# Patient Record
Sex: FEMALE | Race: WHITE | NOT HISPANIC OR LATINO | Employment: OTHER | URBAN - METROPOLITAN AREA
[De-identification: names, ages, dates, MRNs, and addresses within clinical notes are randomized per-mention and may not be internally consistent; named-entity substitution may affect disease eponyms.]

---

## 2017-05-18 ENCOUNTER — ALLSCRIPTS OFFICE VISIT (OUTPATIENT)
Dept: OTHER | Facility: OTHER | Age: 67
End: 2017-05-18

## 2017-07-20 ENCOUNTER — GENERIC CONVERSION - ENCOUNTER (OUTPATIENT)
Dept: OTHER | Facility: OTHER | Age: 67
End: 2017-07-20

## 2017-08-11 ENCOUNTER — GENERIC CONVERSION - ENCOUNTER (OUTPATIENT)
Dept: OTHER | Facility: OTHER | Age: 67
End: 2017-08-11

## 2017-11-01 DIAGNOSIS — E78.5 HYPERLIPIDEMIA: ICD-10-CM

## 2017-11-01 DIAGNOSIS — R53.83 OTHER FATIGUE: ICD-10-CM

## 2017-11-01 DIAGNOSIS — I10 ESSENTIAL (PRIMARY) HYPERTENSION: ICD-10-CM

## 2017-12-20 ENCOUNTER — APPOINTMENT (OUTPATIENT)
Dept: LAB | Age: 67
End: 2017-12-20
Payer: MEDICARE

## 2017-12-20 ENCOUNTER — TRANSCRIBE ORDERS (OUTPATIENT)
Dept: LAB | Age: 67
End: 2017-12-20

## 2017-12-20 DIAGNOSIS — R53.83 OTHER FATIGUE: ICD-10-CM

## 2017-12-20 DIAGNOSIS — I10 ESSENTIAL (PRIMARY) HYPERTENSION: ICD-10-CM

## 2017-12-20 DIAGNOSIS — E78.5 HYPERLIPIDEMIA: ICD-10-CM

## 2017-12-20 LAB
ALBUMIN SERPL BCP-MCNC: 3.7 G/DL (ref 3.5–5)
ALP SERPL-CCNC: 88 U/L (ref 46–116)
ALT SERPL W P-5'-P-CCNC: 23 U/L (ref 12–78)
ANION GAP SERPL CALCULATED.3IONS-SCNC: 6 MMOL/L (ref 4–13)
AST SERPL W P-5'-P-CCNC: 16 U/L (ref 5–45)
BASOPHILS # BLD AUTO: 0.05 THOUSANDS/ΜL (ref 0–0.1)
BASOPHILS NFR BLD AUTO: 1 % (ref 0–1)
BILIRUB SERPL-MCNC: 0.52 MG/DL (ref 0.2–1)
BUN SERPL-MCNC: 18 MG/DL (ref 5–25)
CALCIUM SERPL-MCNC: 9 MG/DL (ref 8.3–10.1)
CHLORIDE SERPL-SCNC: 106 MMOL/L (ref 100–108)
CHOLEST SERPL-MCNC: 201 MG/DL (ref 50–200)
CO2 SERPL-SCNC: 29 MMOL/L (ref 21–32)
CREAT SERPL-MCNC: 0.66 MG/DL (ref 0.6–1.3)
EOSINOPHIL # BLD AUTO: 0.07 THOUSAND/ΜL (ref 0–0.61)
EOSINOPHIL NFR BLD AUTO: 1 % (ref 0–6)
ERYTHROCYTE [DISTWIDTH] IN BLOOD BY AUTOMATED COUNT: 12.4 % (ref 11.6–15.1)
GFR SERPL CREATININE-BSD FRML MDRD: 91 ML/MIN/1.73SQ M
GLUCOSE P FAST SERPL-MCNC: 93 MG/DL (ref 65–99)
HCT VFR BLD AUTO: 44.7 % (ref 34.8–46.1)
HDLC SERPL-MCNC: 58 MG/DL (ref 40–60)
HGB BLD-MCNC: 14.5 G/DL (ref 11.5–15.4)
LDLC SERPL CALC-MCNC: 118 MG/DL (ref 0–100)
LYMPHOCYTES # BLD AUTO: 2.29 THOUSANDS/ΜL (ref 0.6–4.47)
LYMPHOCYTES NFR BLD AUTO: 34 % (ref 14–44)
MCH RBC QN AUTO: 30.8 PG (ref 26.8–34.3)
MCHC RBC AUTO-ENTMCNC: 32.4 G/DL (ref 31.4–37.4)
MCV RBC AUTO: 95 FL (ref 82–98)
MONOCYTES # BLD AUTO: 0.66 THOUSAND/ΜL (ref 0.17–1.22)
MONOCYTES NFR BLD AUTO: 10 % (ref 4–12)
NEUTROPHILS # BLD AUTO: 3.7 THOUSANDS/ΜL (ref 1.85–7.62)
NEUTS SEG NFR BLD AUTO: 54 % (ref 43–75)
NRBC BLD AUTO-RTO: 0 /100 WBCS
PLATELET # BLD AUTO: 302 THOUSANDS/UL (ref 149–390)
PMV BLD AUTO: 11.6 FL (ref 8.9–12.7)
POTASSIUM SERPL-SCNC: 4.2 MMOL/L (ref 3.5–5.3)
PROT SERPL-MCNC: 7.7 G/DL (ref 6.4–8.2)
RBC # BLD AUTO: 4.71 MILLION/UL (ref 3.81–5.12)
SODIUM SERPL-SCNC: 141 MMOL/L (ref 136–145)
TRIGL SERPL-MCNC: 124 MG/DL
TSH SERPL DL<=0.05 MIU/L-ACNC: 1.81 UIU/ML (ref 0.36–3.74)
WBC # BLD AUTO: 6.78 THOUSAND/UL (ref 4.31–10.16)

## 2017-12-20 PROCEDURE — 84443 ASSAY THYROID STIM HORMONE: CPT

## 2017-12-20 PROCEDURE — 80061 LIPID PANEL: CPT

## 2017-12-20 PROCEDURE — 36415 COLL VENOUS BLD VENIPUNCTURE: CPT

## 2017-12-20 PROCEDURE — 80053 COMPREHEN METABOLIC PANEL: CPT

## 2017-12-20 PROCEDURE — 85025 COMPLETE CBC W/AUTO DIFF WBC: CPT

## 2017-12-27 DIAGNOSIS — R92.8 OTHER ABNORMAL AND INCONCLUSIVE FINDINGS ON DIAGNOSTIC IMAGING OF BREAST: ICD-10-CM

## 2017-12-27 DIAGNOSIS — Z12.31 ENCOUNTER FOR SCREENING MAMMOGRAM FOR MALIGNANT NEOPLASM OF BREAST: ICD-10-CM

## 2017-12-28 ENCOUNTER — ALLSCRIPTS OFFICE VISIT (OUTPATIENT)
Dept: OTHER | Facility: OTHER | Age: 67
End: 2017-12-28

## 2018-01-12 ENCOUNTER — HOSPITAL ENCOUNTER (OUTPATIENT)
Dept: RADIOLOGY | Age: 68
Discharge: HOME | End: 2018-01-12
Payer: MEDICARE

## 2018-01-12 DIAGNOSIS — Z12.31 ENCOUNTER FOR SCREENING MAMMOGRAM FOR MALIGNANT NEOPLASM OF BREAST: ICD-10-CM

## 2018-01-12 PROCEDURE — 77067 SCR MAMMO BI INCL CAD: CPT

## 2018-01-13 VITALS
TEMPERATURE: 97.7 F | RESPIRATION RATE: 16 BRPM | SYSTOLIC BLOOD PRESSURE: 162 MMHG | DIASTOLIC BLOOD PRESSURE: 80 MMHG | HEIGHT: 65 IN | WEIGHT: 184.25 LBS | HEART RATE: 68 BPM | BODY MASS INDEX: 30.7 KG/M2

## 2018-01-22 VITALS
TEMPERATURE: 96.6 F | DIASTOLIC BLOOD PRESSURE: 80 MMHG | RESPIRATION RATE: 16 BRPM | HEART RATE: 72 BPM | WEIGHT: 180.13 LBS | HEIGHT: 65 IN | SYSTOLIC BLOOD PRESSURE: 136 MMHG | BODY MASS INDEX: 30.01 KG/M2

## 2018-01-23 NOTE — PROCEDURES
Chief Complaint  decreased hearing      Current Meds   1  Alendronate Sodium 35 MG Oral Tablet; TAKE 1 TABLET WEEKLY ON AN EMPTY   STOMACH 30-60 MINUTES BEFORE BREAKFAST WITH 8-12 OUNCES OF WATER  DO   NOT LIE DOWN AFTER TAKING PILL; Therapy: 45SAH8793 to (Last Rx:17Nov2016)  Requested for: 14UGL1045 Ordered   2  EQL Vitamin D3 1000 UNIT TABS Recorded   3  MetroNIDAZOLE 0 75 % External Cream; APPLY AND GENTLY MASSAGE INTO   AFFECTED AREA(S) TWICE DAILY; Therapy: 47EDM8521 to (Last Rx:17Nov2016)  Requested for: 43RUD9287 Ordered   4  Valsartan 160 MG Oral Tablet; take 1 tablet by mouth every day; Therapy: 48MUY5297 to (Dairl Height)  Requested for: 66HZI3838; Last   Rx:[id number] Ordered    Allergies    1  PriLOSEC OTC TBEC    Vitals  Signs    Temperature: 96 6 F  Heart Rate: 72  Respiration: 16  Systolic: 317  Diastolic: 80  Height: 5 ft 5 in  Weight: 180 lb 2 oz  BMI Calculated: 29 97  BSA Calculated: 1 89  Pain Scale: 0    Procedure    Procedure: cerumen removal    Indication: tympanic membrane(s) could not be visualized and cerumen impaction in both ears  Procedure Note: The procedure was performed by the Provider and lasted 5 minute(s)  A otoscope and speculum was placed in the ear canal(s) to visualize the ear canal debris  The ear was cleaned by using a curette  The procedure was successful  Post-Procedure:   Patient Status: the patient tolerated the procedure well  Complications: there were no complications and Patient had immediate improvement in hearing following procedure  Patient instructions: dry ear precautions and avoid using q-tips  Follow-up as needed  Assessment    1   Bilateral impacted cerumen (380 4) (H61 23)    Plan  Encounter for screening mammogram for breast cancer    · * MAMMO SCREENING BILATERAL W CAD; Status:Hold For - Scheduling; Requested  for:41Ztr0703;   Osteoporosis    · Alendronate Sodium 35 MG Oral Tablet; TAKE 1 TABLET WEEKLY ON AN EMPTY  STOMACH 30-60 MINUTES BEFORE BREAKFAST WITH 8-12 OUNCES OF WATER  DO  NOT LIE DOWN AFTER TAKING PILL  Screening for genitourinary condition    · *VB - Urinary Incontinence Screen (Dx Z13 89 Screen for UI); Status:Complete;   Done:  27BLD2561 09:36AM    Discussion/Summary    Reviewed prevention of cerumen build up  Will periodically review AC for cerumen at routine visits  The treatment plan was reviewed with the patient/guardian   The patient/guardian understands and agrees with the treatment plan      Signatures   Electronically signed by : DOLORES Cast ; Dec 29 2017 11:15AM EST                       (Author)

## 2018-01-23 NOTE — PROGRESS NOTES
Assessment    1  Initial Medicare annual wellness visit (V70 0) (Z00 00)    Plan  Encounter for screening mammogram for breast cancer    · * MAMMO SCREENING BILATERAL W CAD; Status:Hold For - Scheduling,Retrospective  By Protocol Authorization; Requested for:[id number];   Screening for genitourinary condition    · *VB - Urinary Incontinence Screen (Dx Z13 89 Screen for UI); Status:Resulted - Requires  Verification,Retrospective By Protocol Authorization;   Done: 87HIV0530 09:36AM    Discussion/Summary  Impression: Initial Annual Wellness Visit  Cardiovascular screening and counseling: screening is current, EKG recommended and Dx - V81 2 Screen for CV Disorder  Diabetes screening and counseling: screening is current  Colorectal cancer screening and counseling: the risks and benefits of screening were discussed, screening is current, colorectal cancer screening due every 5 year(s) and Due April 2019, Dr Juan Manuel Alfredo  Breast cancer screening and counseling: the risks and benefits of screening were discussed, due for a screening mammogram and screening mammogram due every 2 year(s)  Cervical cancer screening and counseling: screening not indicated  Osteoporosis screening and counseling: screening is current  Abdominal aortic aneurysm screening and counseling: screening not indicated  Glaucoma screening and counseling: screening is current, glaucoma screening due every 1 year(s) and Dr Aleksandra Cannon  HIV screening and counseling: screening not indicated  Hepatitis C Screening: the patient was counseled on Hepatitis C screening  The patient agrees to Hepatitis C screening  Hep C screening done several years ago  Advance Directive Planning: not complete, she was encouraged to follow-up with me to discuss her questions and/or decisions  Patient Discussion: plan discussed with the patient, follow-up visit needed in 6       Self Referrals: Yes Orthopedics, Ophthalmology      Chief Complaint  Initial Medicare Wellness History of Present Illness  The patient is being seen for the initial annual wellness visit  Medicare Screening and Risk Factors   Hospitalizations: she has been previously hospitalizied, she has been hospitalized 3 times and childhood measles, fractures femur and shoulder  Medicare Screening Tests Risk Questions   Abdominal aortic aneurysm risk assessment: none indicated  Osteoporosis risk assessment: , female gender, over 48years of age and already screened  HIV risk assessment: none indicated  Drug and Alcohol Use: The patient is a former cigarette smoker and quit smoking 1991  She has smoked for 20 year(s) and has 40 pack year(s) of cigarette use  The patient reports never drinking alcohol  She has never used illicit drugs  Diet and Physical Activity: Current diet includes well balanced meals, limited junk food, 1-2 cups of coffee per day and <1 cans of regular soda per day  She exercises infrequently and Pushes 's wheelchair every day, occasional walking  Mood Disorder and Cognitive Impairment Screening: Anxiety screening was done using PAT and score was 0  Depression screening was done using PHQ and score was 0  Cognitive impairment screening: denies difficulty learning/retaining new information, denies difficulty handling complex tasks, denies difficulty with reasoning, denies difficulty with spatial ability and orientation, denies difficulty with language and denies difficulty with behavior  Functional Ability/Level of Safety: Hearing is gradual decrease (often wax or congestion associated)  She reports hearing difficulties  She does not use a hearing aid  The patient is currently able to participate in social activities with limitations, but able to do activities of daily living without limitations, able to do instrumental activities of daily living without limitations and able to drive without limitations   Activities of daily living details: does not need help using the phone, no transportation help needed, does not need help shopping, no meal preparation help needed, does not need help doing housework, does not need help doing laundry, does not need help managing medications and does not need help managing money  Fall risk factors: The patient fell 1 times in the past 12 months  knee gave out, no injury  Injury History: no polypharmacy, no alcohol use, no mobility impairment, antidepressant use, no deconditioning, no postural hypotension, no sedative use, no visual impairment, no urinary incontinence, antihypertensive use, no cognitive impairment, up and go test was normal and previous fall  Home safety risk factors:  no unfamiliar surroundings, no loose rugs, no poor household lighting, no uneven floors, no household clutter, grab bars in the bathroom and handrails on the stairs  Advance Directives: Advance directives: no living will, no durable power of  for health care directives and no advance directives  end of life decisions were reviewed with the patient and I agree with the patient's decisions  Co-Managers and Medical Equipment/Suppliers: See Patient Care Team   Symptoms Include: recent fall, but no confusion, no lightheadedness, no vertigo, no dizziness, no syncope, no impaired balance, no visual problems, no leg weakness and no recurring falls  Associated symptoms:  no pain from the injury, no impaired mobility and no impaired ability to live independently  Urinary Incontinence Symptoms includes: urinary urgency, but no urinary incontinence and no urinary frequency        Review of Systems    Musculoskeletal: Hip and knee pain  Active Problems    1  Allergic rhinitis (477 9) (J30 9)   2  Anxiety (300 00) (F41 9)   3  Benign essential hypertension (401 1) (I10)   4  Common migraine without aura (346 10) (G43 009)   5  Encounter for screening mammogram for breast cancer (V76 12) (Z12 31)   6  Esophageal reflux (530 81) (K21 9)   7   Fatigue (780 79) (R53 83)   8  Hyperlipidemia (272 4) (E78 5)   9  Impacted cerumen of right ear (380 4) (H61 21)   10  Lipoma of neck (214 1) (D17 0)   11  Lower back pain (724 2) (M54 5)   12  Osteoporosis (733 00) (M81 0)   13  Preoperative clearance (V72 84) (Z01 818)   14  Right knee pain (719 46) (M25 561)   15  Rosacea (695 3) (L71 9)   16  Screening for colorectal cancer (V76 51) (Z12 11,Z12 12)   17  Screening for genitourinary condition (V81 6) (Z13 89)    Past Medical History    1  History of Cough (786 2) (R05)   2  History of Need for immunization against influenza (V04 81) (Z23)   3  History of Need for Tdap vaccination (V06 1) (Z23)   4  History of Viral infection (079 99) (B34 9)   5  History of Vitamin D deficiency (268 9) (E55 9)    Surgical History    1  History of Dilation And Curettage   2  History of Open Fracture Reduction   3  History of Shoulder Arthroplasty Total Shoulder Replacement    Family History  Brother    1  Family history of Coronary Artery Disease (V17 49)  Family History    2  Family history of Colon Cancer (V16 0)   3  Family history of Coronary Artery Disease (V17 49)   4  Family history of Diabetes Mellitus (V18 0)   5  Family history of Hyperlipidemia   6  Family history of Hypertension (V17 49)   7  Family history of Peripheral Vascular Disease   8  Family history of Stroke Syndrome (V17 1)    Social History    · History of Heavy tobacco smoker (305 1) (F17 200)   ·    · Never Drank Alcohol    Current Meds   1  Alendronate Sodium 35 MG Oral Tablet; TAKE 1 TABLET WEEKLY ON AN EMPTY   STOMACH 30-60 MINUTES BEFORE BREAKFAST WITH 8-12 OUNCES OF WATER  DO   NOT LIE DOWN AFTER TAKING PILL; Therapy: 87UQF6540 to (Last Rx:17Nov2016)  Requested for: 05JVO4413 Ordered   2  EQL Vitamin D3 1000 UNIT TABS Recorded   3  MetroNIDAZOLE 0 75 % External Cream; APPLY AND GENTLY MASSAGE INTO   AFFECTED AREA(S) TWICE DAILY;    Therapy: 32TFI0695 to (Last Rx:17Nov2016)  Requested for: 39FHL0560 Ordered   4  Valsartan 160 MG Oral Tablet; take 1 tablet by mouth every day; Therapy: 62RCD3270 to (Jessica Madden)  Requested for: 19YTP2274; Last   Rx:37Tgc2258 Ordered    Allergies    1   PriLOSEC OTC TBEC    Immunizations  Influenza --- Bouchra Mora: 21-Oct-2015; Kristal Mendozabrooke: 03-Oct-2016; Series3: 07-Oct-2017   PPSV --- Bouchra Morgan: 12-Jun-2014   Tdap --- Series1: 04-Feb-2010     Vitals  Signs   Recorded: 72WXA5538 09:33AM   Temperature: 96 6 F  Heart Rate: 72  Respiration: 16  Systolic: 617  Diastolic: 80  Height: 5 ft 5 in  Weight: 180 lb 2 oz  BMI Calculated: 29 97  BSA Calculated: 1 89  Pain Scale: 0    Results/Data  Falls Risk Assessment (Dx Z13 89 Screen for Neurologic Disorder) 65HMW7013 09:36AM User, Ahs     Test Name Result Flag Reference   Falls Risk      1 fall without injury in the past year     *VB - Urinary Incontinence Screen (Dx Z13 89 Screen for UI) 46PCF2408 09:36AM Charla Padilla     Test Name Result Flag Reference   Urinary Incontinence Assessment negative                     Signatures   Electronically signed by : DOLORES Gunn ; Dec 28 2017 10:25AM EST                       (Author)

## 2018-01-24 ENCOUNTER — HOSPITAL ENCOUNTER (OUTPATIENT)
Dept: MAMMOGRAPHY | Facility: CLINIC | Age: 68
Discharge: HOME | End: 2018-01-24
Payer: MEDICARE

## 2018-01-24 ENCOUNTER — HOSPITAL ENCOUNTER (OUTPATIENT)
Dept: ULTRASOUND IMAGING | Facility: CLINIC | Age: 68
Discharge: HOME | End: 2018-01-24
Payer: MEDICARE

## 2018-01-24 DIAGNOSIS — R92.8 OTHER ABNORMAL AND INCONCLUSIVE FINDINGS ON DIAGNOSTIC IMAGING OF BREAST: ICD-10-CM

## 2018-01-24 PROCEDURE — 76642 ULTRASOUND BREAST LIMITED: CPT

## 2018-01-24 PROCEDURE — 77065 DX MAMMO INCL CAD UNI: CPT

## 2018-07-16 ENCOUNTER — OFFICE VISIT (OUTPATIENT)
Dept: FAMILY MEDICINE CLINIC | Facility: CLINIC | Age: 68
End: 2018-07-16
Payer: MEDICARE

## 2018-07-16 VITALS
WEIGHT: 180.6 LBS | DIASTOLIC BLOOD PRESSURE: 90 MMHG | BODY MASS INDEX: 30.83 KG/M2 | RESPIRATION RATE: 16 BRPM | SYSTOLIC BLOOD PRESSURE: 122 MMHG | HEIGHT: 64 IN | TEMPERATURE: 96.9 F | HEART RATE: 80 BPM

## 2018-07-16 DIAGNOSIS — R26.89 IMBALANCE: ICD-10-CM

## 2018-07-16 DIAGNOSIS — E55.9 VITAMIN D DEFICIENCY: ICD-10-CM

## 2018-07-16 DIAGNOSIS — E78.5 HYPERLIPIDEMIA, UNSPECIFIED HYPERLIPIDEMIA TYPE: ICD-10-CM

## 2018-07-16 DIAGNOSIS — I10 BENIGN ESSENTIAL HYPERTENSION: Primary | ICD-10-CM

## 2018-07-16 PROBLEM — D17.0 LIPOMA OF NECK: Status: ACTIVE | Noted: 2017-05-18

## 2018-07-16 PROCEDURE — 99214 OFFICE O/P EST MOD 30 MIN: CPT | Performed by: FAMILY MEDICINE

## 2018-07-16 RX ORDER — VALSARTAN 160 MG/1
160 TABLET ORAL DAILY
Refills: 3 | COMMUNITY
Start: 2018-05-29 | End: 2018-09-13 | Stop reason: CLARIF

## 2018-07-16 RX ORDER — PRAVASTATIN SODIUM 20 MG
20 TABLET ORAL DAILY
Qty: 90 TABLET | Refills: 3 | Status: SHIPPED | OUTPATIENT
Start: 2018-07-16 | End: 2019-05-22 | Stop reason: SDUPTHER

## 2018-07-16 RX ORDER — ALENDRONATE SODIUM 35 MG/1
1 TABLET ORAL
COMMUNITY
Start: 2014-06-12 | End: 2018-12-21 | Stop reason: SDUPTHER

## 2018-07-16 NOTE — PROGRESS NOTES
Chaparrita Valdez 1950 female MRN: [id number]    Family Medicine Follow-up Visit    ASSESSMENT/PLAN  Problem List Items Addressed This Visit        Cardiovascular and Mediastinum    Benign essential hypertension - Primary     Borderline control  Asked her to resume doing home BP logs  Discussed goal of <150/<90 and contact me if consistently out of goal   Continue current valsartan 160 mg daily  Will reassess at f/u after labs in 2 months  Relevant Medications    valsartan (DIOVAN) 160 mg tablet    Other Relevant Orders    Comprehensive metabolic panel    TSH, 3rd generation with T4 reflex    CBC and differential       Other    Hyperlipidemia     10 yr ASCVD risk based on lipid panel from 12/2017 is 10 2%  Patient is now willing to start statin  She was concerned about muscle pain due to multiple family members intolerant of Lipitor  Will Rx pravastatin 20 mg daily  Repeat lipids and CMP in 1-2 months  Relevant Medications    pravastatin (PRAVACHOL) 20 mg tablet    Other Relevant Orders    Lipid panel    Comprehensive metabolic panel    Imbalance     Neuro exam WNL  Suspect this may be related to volume status as environmental temperature has increased  Will check CBC and Vit D level with other labs  Ensure adequate hydration  F/U after labs  Consider keeping a log of symptoms  Reviewed warning signs  Relevant Orders    CBC and differential      Other Visit Diagnoses     Vitamin D deficiency         Relevant Orders    Vitamin D 25 hydroxy              Future Appointments  Date Time Provider Bib Son   9/13/2018 8:30 AM Mickie Whitehead MD [facility]          SUBJECTIVE  CC: Follow-up (blood pressure )    HPI:  Chaparrita Valdez is a 69 y o  female who presents for F/U of:  HTN - taking meds as prescribed  Denies problems  No home BPs  Feels less stress at home  Hyperlipidemia - last lipid 12/2017    States multiple family members had  Severe muscle pain with Lipitor  Osteoporosis - taking alendronate and reports occasional upset stomach after taking it despite sitting up after taking it  New issue - dysequilibrium  Feels off balance intermittently x 1 month  Mostly when standing on feet for extended period of time doing activity  More to the Left  Relieved by sitting for a few minutes  No falls yet but fears she will  Associated nausea but no vomiting  Denies associated HA, dizziness, vision changes, change in hearing, or syncope  Review of Systems   Constitutional: Negative for fever  HENT: Negative for hearing loss  Feels ears are clogged    Eyes: Negative for visual disturbance  Respiratory: Negative for shortness of breath  Cardiovascular: Negative for chest pain  Gastrointestinal: Positive for nausea  Negative for abdominal pain, constipation, diarrhea and vomiting  Genitourinary: Negative for decreased urine volume  Neurological: Negative for dizziness, syncope, light-headedness and headaches       Historical Information   The patient history was reviewed as follows:    Past Medical History:   Diagnosis Date    Vitamin D deficiency 10/14/2013     Past Surgical History:   Procedure Laterality Date    DILATION AND CURETTAGE OF UTERUS      OTHER SURGICAL HISTORY  03/14/2016    Open Fracture Reduction    TOTAL SHOULDER REPLACEMENT       Family History   Problem Relation Age of Onset    Coronary artery disease Brother     Colon cancer Family     Coronary artery disease Family     Diabetes Family         Diabetes Mellitus    Hyperlipidemia Family     Hypertension Family     Peripheral vascular disease Family     Stroke Family         Stroke Syndrome      Social History   History   Alcohol Use No     History   Drug use: Unknown     History   Smoking Status    Former Smoker   Smokeless Tobacco    Not on file     Comment: Quite 25 yrs ago - Former heavy smoker; 2-3 ppd       Medications:     Current Outpatient Prescriptions:     alendronate (FOSAMAX) 35 mg tablet, Take 1 tablet by mouth, Disp: , Rfl:     Cholecalciferol (EQL VITAMIN D3) 1000 units tablet, Take by mouth, Disp: , Rfl:     metroNIDAZOLE (METROCREAM) 0 75 % cream, Apply topically 2 (two) times a day, Disp: , Rfl:     valsartan (DIOVAN) 160 mg tablet, Take 160 mg by mouth daily, Disp: , Rfl: 3    pravastatin (PRAVACHOL) 20 mg tablet, Take 1 tablet (20 mg total) by mouth daily, Disp: 90 tablet, Rfl: 3  Allergies   Allergen Reactions    Omeprazole Other (See Comments)       OBJECTIVE    Vitals:   Vitals:    07/16/18 0835   BP: 122/90   Pulse: 80   Resp: 16   Temp: (!) 96 9 °F (36 1 °C)   Weight: 81 9 kg (180 lb 9 6 oz)   Height: 5' 4 4" (1 636 m)       Physical Exam   Constitutional: She is oriented to person, place, and time  She appears well-developed and well-nourished  No distress  HENT:   Head: Normocephalic and atraumatic  Right Ear: External ear normal    Left Ear: External ear normal    Mouth/Throat: Oropharynx is clear and moist    Eyes: Conjunctivae and EOM are normal  Pupils are equal, round, and reactive to light  Right eye exhibits no discharge  Left eye exhibits no discharge  No scleral icterus  Neck: Normal range of motion  Neck supple  No thyromegaly present  Cardiovascular: Normal rate, regular rhythm, normal heart sounds and intact distal pulses  Pulmonary/Chest: Effort normal and breath sounds normal    Abdominal: Soft  Bowel sounds are normal  She exhibits no distension  There is no tenderness  Musculoskeletal: She exhibits no edema  Lymphadenopathy:     She has no cervical adenopathy  Neurological: She is alert and oriented to person, place, and time  She has normal strength  No cranial nerve deficit or sensory deficit  She displays a negative Romberg sign  Coordination and gait normal    Reflex Scores:       Bicep reflexes are 2+ on the right side and 2+ on the left side         Brachioradialis reflexes are 2+ on the right side and 2+ on the left side  Patellar reflexes are 2+ on the right side and 2+ on the left side  Achilles reflexes are 2+ on the right side and 2+ on the left side  Skin: Skin is warm  No pallor  Psychiatric: She has a normal mood and affect  Her behavior is normal  Thought content normal    Vitals reviewed  Transcribe Orders on 12/20/2017   Component Date Value Ref Range Status    Sodium 12/20/2017 141  136 - 145 mmol/L Final    Potassium 12/20/2017 4 2  3 5 - 5 3 mmol/L Final    Chloride 12/20/2017 106  100 - 108 mmol/L Final    CO2 12/20/2017 29  21 - 32 mmol/L Final    Anion Gap 12/20/2017 6  4 - 13 mmol/L Final    BUN 12/20/2017 18  5 - 25 mg/dL Final    Creatinine 12/20/2017 0 66  0 60 - 1 30 mg/dL Final    Standardized to IDMS reference method    Glucose, Fasting 12/20/2017 93  65 - 99 mg/dL Final      Specimen collection should occur prior to Sulfasalazine administration due to the potential for falsely depressed results  Specimen collection should occur prior to Sulfapyridine administration due to the potential for falsely elevated results   Calcium 12/20/2017 9 0  8 3 - 10 1 mg/dL Final    AST 12/20/2017 16  5 - 45 U/L Final      Specimen collection should occur prior to Sulfasalazine administration due to the potential for falsely depressed results   ALT 12/20/2017 23  12 - 78 U/L Final      Specimen collection should occur prior to Sulfasalazine and/or Sulfapyridine administration due to the potential for falsely depressed results       Alkaline Phosphatase 12/20/2017 88  46 - 116 U/L Final    Total Protein 12/20/2017 7 7  6 4 - 8 2 g/dL Final    Albumin 12/20/2017 3 7  3 5 - 5 0 g/dL Final    Total Bilirubin 12/20/2017 0 52  0 20 - 1 00 mg/dL Final    eGFR 12/20/2017 91  ml/min/1 73sq m Final    Cholesterol 12/20/2017 201* 50 - 200 mg/dL Final    Triglycerides 12/20/2017 124  <=150 mg/dL Final    Specimen collection should occur prior to N-Acetylcysteine or Metamizole administration due to the potential for falsely depressed results   HDL, Direct 12/20/2017 58  40 - 60 mg/dL Final    Specimen collection should occur prior to Metamizole administration due to the potential for falsley depressed results      LDL Calculated 12/20/2017 118* 0 - 100 mg/dL Final    WBC 12/20/2017 6 78  4 31 - 10 16 Thousand/uL Final    RBC 12/20/2017 4 71  3 81 - 5 12 Million/uL Final    Hemoglobin 12/20/2017 14 5  11 5 - 15 4 g/dL Final    Hematocrit 12/20/2017 44 7  34 8 - 46 1 % Final    MCV 12/20/2017 95  82 - 98 fL Final    MCH 12/20/2017 30 8  26 8 - 34 3 pg Final    MCHC 12/20/2017 32 4  31 4 - 37 4 g/dL Final    RDW 12/20/2017 12 4  11 6 - 15 1 % Final    MPV 12/20/2017 11 6  8 9 - 12 7 fL Final    Platelets 01/74/1161 302  149 - 390 Thousands/uL Final    nRBC 12/20/2017 0  /100 WBCs Final    Neutrophils Relative 12/20/2017 54  43 - 75 % Final    Lymphocytes Relative 12/20/2017 34  14 - 44 % Final    Monocytes Relative 12/20/2017 10  4 - 12 % Final    Eosinophils Relative 12/20/2017 1  0 - 6 % Final    Basophils Relative 12/20/2017 1  0 - 1 % Final    Neutrophils Absolute 12/20/2017 3 70  1 85 - 7 62 Thousands/µL Final    Lymphocytes Absolute 12/20/2017 2 29  0 60 - 4 47 Thousands/µL Final    Monocytes Absolute 12/20/2017 0 66  0 17 - 1 22 Thousand/µL Final    Eosinophils Absolute 12/20/2017 0 07  0 00 - 0 61 Thousand/µL Final    Basophils Absolute 12/20/2017 0 05  0 00 - 0 10 Thousands/µL Final    TSH 3RD GENERATON 12/20/2017 1 810  0 358 - 3 740 uIU/mL Final       Linda Bragg MD  [facility]  7/16/2018

## 2018-07-16 NOTE — ASSESSMENT & PLAN NOTE
Neuro exam WNL  Suspect this may be related to volume status as environmental temperature has increased  Will check CBC and Vit D level with other labs  Ensure adequate hydration  F/U after labs  Consider keeping a log of symptoms  Reviewed warning signs

## 2018-07-16 NOTE — ASSESSMENT & PLAN NOTE
10 yr ASCVD risk based on lipid panel from 12/2017 is 10 2%  Patient is now willing to start statin  She was concerned about muscle pain due to multiple family members intolerant of Lipitor  Will Rx pravastatin 20 mg daily  Repeat lipids and CMP in 1-2 months

## 2018-07-16 NOTE — ASSESSMENT & PLAN NOTE
Borderline control  Asked her to resume doing home BP logs  Discussed goal of <150/<90 and contact me if consistently out of goal   Continue current valsartan 160 mg daily  Will reassess at f/u after labs in 2 months

## 2018-08-09 ENCOUNTER — TELEPHONE (OUTPATIENT)
Dept: FAMILY MEDICINE CLINIC | Facility: CLINIC | Age: 68
End: 2018-08-09

## 2018-08-09 DIAGNOSIS — I10 ESSENTIAL HYPERTENSION: Primary | ICD-10-CM

## 2018-08-09 RX ORDER — AMLODIPINE BESYLATE 5 MG/1
5 TABLET ORAL DAILY
Qty: 90 TABLET | Refills: 0 | Status: SHIPPED | OUTPATIENT
Start: 2018-08-09 | End: 2018-09-13 | Stop reason: SDUPTHER

## 2018-08-09 NOTE — TELEPHONE ENCOUNTER
Patient of Dr Hansel Jansen,  Requesting a call back regarding blood pressure readings and feeling a little off balance            08/07/18 159/96       " "          146/99 later that day    08/08/18  159/99

## 2018-08-09 NOTE — TELEPHONE ENCOUNTER
Called patient, states she is sure her blood pressure has been elevated for some time, she does have symptoms of being off balance, occasional vertigo and has headaches  She is taking Valsartan, is aware that is no longer available  She states she is very busy with caring for her  who is chair bound  Wants to know if you want to make any adjustments to her medication therapies?

## 2018-08-09 NOTE — TELEPHONE ENCOUNTER
I can't make adjustments without having data, either a BP log to review or a visit  However, valsartan is not unavailable - only certain manufacturers were recalled  If her pharmacy was using one of them they should be providing her with an alternative so she shouldn't be without medication

## 2018-08-09 NOTE — TELEPHONE ENCOUNTER
I sent an Rx for amlodipine  She should take this AND the valsartan (when pharmacy gets new supply from previous recall)  She should continue daily home BP measurements, but it can take 2-3 weeks for BP to even out  I'd like her to schedule a BP f/u appt at 3 weeks

## 2018-08-10 NOTE — TELEPHONE ENCOUNTER
Spoke with patient, has appointment scheduled 9/13/18  She will keep blood pressure log, she will call in 2 weeks with readings  She will also check with pharmacy in regards to availability of Valsartan

## 2018-08-22 ENCOUNTER — TELEPHONE (OUTPATIENT)
Dept: FAMILY MEDICINE CLINIC | Facility: CLINIC | Age: 68
End: 2018-08-22

## 2018-08-22 NOTE — TELEPHONE ENCOUNTER
Pt came in and dropped off her blood pressure readings from 8/11 to 8/21, bringing form to [place]

## 2018-08-29 NOTE — TELEPHONE ENCOUNTER
I reviewed the BP's (placed to be scanned into chart)  They don't look too bad, I think we can give this a little longer before we consider titrating dose  She should be seen in a month with continued BP log to review/discuss management

## 2018-09-06 ENCOUNTER — APPOINTMENT (OUTPATIENT)
Dept: LAB | Age: 68
End: 2018-09-06
Payer: MEDICARE

## 2018-09-06 DIAGNOSIS — R26.89 IMBALANCE: ICD-10-CM

## 2018-09-06 DIAGNOSIS — E78.5 HYPERLIPIDEMIA, UNSPECIFIED HYPERLIPIDEMIA TYPE: ICD-10-CM

## 2018-09-06 DIAGNOSIS — I10 BENIGN ESSENTIAL HYPERTENSION: ICD-10-CM

## 2018-09-06 DIAGNOSIS — E55.9 VITAMIN D DEFICIENCY: ICD-10-CM

## 2018-09-06 LAB
25(OH)D3 SERPL-MCNC: 19.6 NG/ML (ref 30–100)
ALBUMIN SERPL BCP-MCNC: 3.6 G/DL (ref 3.5–5)
ALP SERPL-CCNC: 83 U/L (ref 46–116)
ALT SERPL W P-5'-P-CCNC: 26 U/L (ref 12–78)
ANION GAP SERPL CALCULATED.3IONS-SCNC: 7 MMOL/L (ref 4–13)
AST SERPL W P-5'-P-CCNC: 18 U/L (ref 5–45)
BASOPHILS # BLD AUTO: 0.07 THOUSANDS/ΜL (ref 0–0.1)
BASOPHILS NFR BLD AUTO: 1 % (ref 0–1)
BILIRUB SERPL-MCNC: 0.68 MG/DL (ref 0.2–1)
BUN SERPL-MCNC: 15 MG/DL (ref 5–25)
CALCIUM SERPL-MCNC: 8.6 MG/DL (ref 8.3–10.1)
CHLORIDE SERPL-SCNC: 107 MMOL/L (ref 100–108)
CHOLEST SERPL-MCNC: 166 MG/DL (ref 50–200)
CO2 SERPL-SCNC: 26 MMOL/L (ref 21–32)
CREAT SERPL-MCNC: 0.56 MG/DL (ref 0.6–1.3)
EOSINOPHIL # BLD AUTO: 0.12 THOUSAND/ΜL (ref 0–0.61)
EOSINOPHIL NFR BLD AUTO: 2 % (ref 0–6)
ERYTHROCYTE [DISTWIDTH] IN BLOOD BY AUTOMATED COUNT: 11.9 % (ref 11.6–15.1)
GFR SERPL CREATININE-BSD FRML MDRD: 95 ML/MIN/1.73SQ M
GLUCOSE P FAST SERPL-MCNC: 97 MG/DL (ref 65–99)
HCT VFR BLD AUTO: 44.2 % (ref 34.8–46.1)
HDLC SERPL-MCNC: 60 MG/DL (ref 40–60)
HGB BLD-MCNC: 14 G/DL (ref 11.5–15.4)
IMM GRANULOCYTES # BLD AUTO: 0.01 THOUSAND/UL (ref 0–0.2)
IMM GRANULOCYTES NFR BLD AUTO: 0 % (ref 0–2)
LDLC SERPL CALC-MCNC: 80 MG/DL (ref 0–100)
LYMPHOCYTES # BLD AUTO: 2.23 THOUSANDS/ΜL (ref 0.6–4.47)
LYMPHOCYTES NFR BLD AUTO: 33 % (ref 14–44)
MCH RBC QN AUTO: 30.2 PG (ref 26.8–34.3)
MCHC RBC AUTO-ENTMCNC: 31.7 G/DL (ref 31.4–37.4)
MCV RBC AUTO: 96 FL (ref 82–98)
MONOCYTES # BLD AUTO: 0.64 THOUSAND/ΜL (ref 0.17–1.22)
MONOCYTES NFR BLD AUTO: 9 % (ref 4–12)
NEUTROPHILS # BLD AUTO: 3.73 THOUSANDS/ΜL (ref 1.85–7.62)
NEUTS SEG NFR BLD AUTO: 55 % (ref 43–75)
NONHDLC SERPL-MCNC: 106 MG/DL
NRBC BLD AUTO-RTO: 0 /100 WBCS
PLATELET # BLD AUTO: 287 THOUSANDS/UL (ref 149–390)
PMV BLD AUTO: 11.2 FL (ref 8.9–12.7)
POTASSIUM SERPL-SCNC: 3.8 MMOL/L (ref 3.5–5.3)
PROT SERPL-MCNC: 7.3 G/DL (ref 6.4–8.2)
RBC # BLD AUTO: 4.63 MILLION/UL (ref 3.81–5.12)
SODIUM SERPL-SCNC: 140 MMOL/L (ref 136–145)
TRIGL SERPL-MCNC: 131 MG/DL
TSH SERPL DL<=0.05 MIU/L-ACNC: 1.82 UIU/ML (ref 0.36–3.74)
WBC # BLD AUTO: 6.8 THOUSAND/UL (ref 4.31–10.16)

## 2018-09-06 PROCEDURE — 82306 VITAMIN D 25 HYDROXY: CPT

## 2018-09-06 PROCEDURE — 80053 COMPREHEN METABOLIC PANEL: CPT

## 2018-09-06 PROCEDURE — 80061 LIPID PANEL: CPT

## 2018-09-06 PROCEDURE — 85025 COMPLETE CBC W/AUTO DIFF WBC: CPT

## 2018-09-06 PROCEDURE — 84443 ASSAY THYROID STIM HORMONE: CPT

## 2018-09-06 PROCEDURE — 36415 COLL VENOUS BLD VENIPUNCTURE: CPT

## 2018-09-13 ENCOUNTER — OFFICE VISIT (OUTPATIENT)
Dept: FAMILY MEDICINE CLINIC | Facility: CLINIC | Age: 68
End: 2018-09-13
Payer: MEDICARE

## 2018-09-13 VITALS
HEIGHT: 64 IN | WEIGHT: 181.6 LBS | RESPIRATION RATE: 16 BRPM | DIASTOLIC BLOOD PRESSURE: 80 MMHG | HEART RATE: 78 BPM | BODY MASS INDEX: 31 KG/M2 | TEMPERATURE: 97.8 F | SYSTOLIC BLOOD PRESSURE: 122 MMHG

## 2018-09-13 DIAGNOSIS — I10 BENIGN ESSENTIAL HYPERTENSION: Primary | ICD-10-CM

## 2018-09-13 DIAGNOSIS — E55.9 VITAMIN D DEFICIENCY: ICD-10-CM

## 2018-09-13 DIAGNOSIS — E78.5 HYPERLIPIDEMIA, UNSPECIFIED HYPERLIPIDEMIA TYPE: ICD-10-CM

## 2018-09-13 DIAGNOSIS — Z23 NEED FOR PNEUMOCOCCAL VACCINATION: ICD-10-CM

## 2018-09-13 DIAGNOSIS — I10 ESSENTIAL HYPERTENSION: ICD-10-CM

## 2018-09-13 PROCEDURE — G0009 ADMIN PNEUMOCOCCAL VACCINE: HCPCS

## 2018-09-13 PROCEDURE — 99214 OFFICE O/P EST MOD 30 MIN: CPT | Performed by: FAMILY MEDICINE

## 2018-09-13 PROCEDURE — 90670 PCV13 VACCINE IM: CPT

## 2018-09-13 RX ORDER — AMLODIPINE BESYLATE 5 MG/1
5 TABLET ORAL DAILY
Qty: 90 TABLET | Refills: 3 | Status: SHIPPED | OUTPATIENT
Start: 2018-09-13 | End: 2018-12-21 | Stop reason: SINTOL

## 2018-09-13 RX ORDER — LOSARTAN POTASSIUM 50 MG/1
50 TABLET ORAL DAILY
Qty: 90 TABLET | Refills: 3 | Status: SHIPPED | OUTPATIENT
Start: 2018-09-13 | End: 2018-12-21

## 2018-09-13 RX ORDER — ERGOCALCIFEROL 1.25 MG/1
50000 CAPSULE ORAL WEEKLY
Qty: 12 CAPSULE | Refills: 1 | Status: SHIPPED | OUTPATIENT
Start: 2018-09-13 | End: 2018-12-21

## 2018-09-13 NOTE — PATIENT INSTRUCTIONS
Start Vitamin D prescription - one capsule weekly for 3 months (until supply runs out) and then repeat lab  Anticipate resuming OTC Vit D 2000 IU daily, but may need one additional course of weekly prescription (refill is available on the current Rx)

## 2018-09-13 NOTE — ASSESSMENT & PLAN NOTE
Now on statin, numbers look better  Continue pravastatin 20 mg daily  Repeat lipid and CMP in 1 year

## 2018-09-13 NOTE — PROGRESS NOTES
Jillian Grubbs 1950 female MRN: [id number]    Family Medicine Follow-up Visit    ASSESSMENT/PLAN  Problem List Items Addressed This Visit        Cardiovascular and Mediastinum    Benign essential hypertension - Primary     Now well controlled on valsartan 160 mg and amlodipine 5 mg daily  Pharmacy wants her to switch to losartan for her copay and the recall issues, so will convert valsartan to losartan 50 mg daily  She will continue home BP monitoring with goal <150/<90 discussed  She should return for f/u if goals not maintained with new med  Lab monitoring due annually  Would advise Q6 month BP monitoring in office  Relevant Medications    losartan (COZAAR) 50 mg tablet    amLODIPine (NORVASC) 5 mg tablet       Other    Hyperlipidemia     Now on statin, numbers look better  Continue pravastatin 20 mg daily  Repeat lipid and CMP in 1 year  Vitamin D deficiency     Despite 2000 IU OTC Vit D  Will Rx Vit D 50,000 IU weekly for 3 months, recheck level and may need to repeat course  If normal can resume 2000 IU OTC daily  Relevant Medications    ergocalciferol (VITAMIN D2) 50,000 units    Other Relevant Orders    Vitamin D 25 hydroxy      Other Visit Diagnoses     Essential hypertension        Relevant Medications    losartan (COZAAR) 50 mg tablet    amLODIPine (NORVASC) 5 mg tablet    Need for pneumococcal vaccination        Relevant Orders    PNEUMOCOCCAL CONJUGATE VACCINE 13-VALENT GREATER THAN 6 MONTHS (Completed)          [facility] [place]  Advised her to establish there at time the repeat Vit D level is due and sooner if BP not adequately controlled  No future appointments         SUBJECTIVE  CC: Follow-up (BP and blood work )    HPI:  Jillian Grubbs is a 69 y o  female who presents for follow up on:  HTN - has been doing home BP monitoring while we've titrated her new medication and uncontrolled BP's she had noticed earlier in the summer  Notes a little ankle swelling with amlodipine  BP log 113-136/63-82  HLD - started a statin after last visit in July due to 10 year ASCVD risk 10 2%  Tolerating pravastatin  Vit D def - had increased her OTC dose to 2000 IU daily  Had dysequilibrium she reported at last visit in July  Has almost completely resolved since BP has improved  Review of Systems   Constitutional: Negative for appetite change, chills, fatigue, fever and unexpected weight change  Eyes: Negative for visual disturbance  Respiratory: Negative for cough and shortness of breath  Cardiovascular: Positive for leg swelling  Negative for chest pain and palpitations  Gastrointestinal: Negative for abdominal pain, constipation, diarrhea and nausea  Musculoskeletal: Negative for myalgias  Skin: Negative for rash  Neurological: Positive for dizziness (but better)  Negative for weakness and headaches  Psychiatric/Behavioral: Negative for dysphoric mood and sleep disturbance  The patient is not nervous/anxious        Historical Information   The patient history was reviewed as follows:    Past Medical History:   Diagnosis Date    Vitamin D deficiency 10/14/2013     Past Surgical History:   Procedure Laterality Date    DILATION AND CURETTAGE OF UTERUS      OTHER SURGICAL HISTORY  03/14/2016    Open Fracture Reduction    TOTAL SHOULDER REPLACEMENT       Family History   Problem Relation Age of Onset    Coronary artery disease Brother     Colon cancer Family     Coronary artery disease Family     Diabetes Family         Diabetes Mellitus    Hyperlipidemia Family     Hypertension Family     Peripheral vascular disease Family     Stroke Family         Stroke Syndrome      Social History   History   Alcohol Use No     History   Drug use: Unknown     History   Smoking Status    Former Smoker   Smokeless Tobacco    Not on file     Comment: Quite 25 yrs ago - Former heavy smoker; 2-3 ppd       Medications:     Current Outpatient Prescriptions:     alendronate (FOSAMAX) 35 mg tablet, Take 1 tablet by mouth, Disp: , Rfl:     amLODIPine (NORVASC) 5 mg tablet, Take 1 tablet (5 mg total) by mouth daily, Disp: 90 tablet, Rfl: 3    metroNIDAZOLE (METROCREAM) 0 75 % cream, Apply topically 2 (two) times a day, Disp: , Rfl:     pravastatin (PRAVACHOL) 20 mg tablet, Take 1 tablet (20 mg total) by mouth daily, Disp: 90 tablet, Rfl: 3    ergocalciferol (VITAMIN D2) 50,000 units, Take 1 capsule (50,000 Units total) by mouth once a week, Disp: 12 capsule, Rfl: 1    losartan (COZAAR) 50 mg tablet, Take 1 tablet (50 mg total) by mouth daily, Disp: 90 tablet, Rfl: 3  Allergies   Allergen Reactions    Omeprazole Other (See Comments)       OBJECTIVE    Vitals:   Vitals:    09/13/18 0834   BP: 122/80   Pulse: 78   Resp: 16   Temp: 97 8 °F (36 6 °C)   Weight: 82 4 kg (181 lb 9 6 oz)   Height: 5' 4 4" (1 636 m)       Physical Exam   Constitutional: She is oriented to person, place, and time  She appears well-developed and well-nourished  No distress  HENT:   Head: Normocephalic and atraumatic  Mouth/Throat: Oropharynx is clear and moist    Eyes: Conjunctivae are normal  No scleral icterus  Neck: Neck supple  Cardiovascular: Normal rate, regular rhythm, normal heart sounds and intact distal pulses  No carotid bruits   Pulmonary/Chest: Effort normal and breath sounds normal    Musculoskeletal: She exhibits edema (trace B ankle edema)  Neurological: She is alert and oriented to person, place, and time  Skin: Skin is warm and dry  No pallor  Psychiatric: She has a normal mood and affect  Vitals reviewed       Appointment on 09/06/2018   Component Date Value Ref Range Status    Cholesterol 09/06/2018 166  50 - 200 mg/dL Final      Cholesterol:       Desirable         <200 mg/dl       Borderline         200-239 mg/dl       High              >239           Triglycerides 09/06/2018 131  <=150 mg/dL Final      Triglyceride:     Normal <150 mg/dl     Borderline High 150-199 mg/dl     High            200-499 mg/dl        Very High       >499 mg/dl    Specimen collection should occur prior to N-Acetylcysteine or Metamizole administration due to the potential for falsely depressed results   HDL, Direct 09/06/2018 60  40 - 60 mg/dL Final      HDL Cholesterol:       High    >60 mg/dL       Low     <41 mg/dL  Specimen collection should occur prior to Metamizole administration due to the potential for falsley depressed results   LDL Calculated 09/06/2018 80  0 - 100 mg/dL Final      LDL Cholesterol:     Optimal           <100 mg/dl     Near Optimal      100-129 mg/dl     Above Optimal       Borderline High 130-159 mg/dl       High            160-189 mg/dl       Very High       >189 mg/dl         This screening LDL is a calculated result  It does not have the accuracy of the Direct Measured LDL in the monitoring of patients with hyperlipidemia and/or statin therapy  Direct Measure LDL ([id number]) must be ordered separately in these patients   Non-HDL-Chol (CHOL-HDL) 09/06/2018 106  mg/dl Final    Sodium 09/06/2018 140  136 - 145 mmol/L Final    Potassium 09/06/2018 3 8  3 5 - 5 3 mmol/L Final    Chloride 09/06/2018 107  100 - 108 mmol/L Final    CO2 09/06/2018 26  21 - 32 mmol/L Final    ANION GAP 09/06/2018 7  4 - 13 mmol/L Final    BUN 09/06/2018 15  5 - 25 mg/dL Final    Creatinine 09/06/2018 0 56* 0 60 - 1 30 mg/dL Final    Standardized to IDMS reference method    Glucose, Fasting 09/06/2018 97  65 - 99 mg/dL Final      Specimen collection should occur prior to Sulfasalazine administration due to the potential for falsely depressed results  Specimen collection should occur prior to Sulfapyridine administration due to the potential for falsely elevated results      Calcium 09/06/2018 8 6  8 3 - 10 1 mg/dL Final    AST 09/06/2018 18  5 - 45 U/L Final      Specimen collection should occur prior to Sulfasalazine administration due to the potential for falsely depressed results   ALT 09/06/2018 26  12 - 78 U/L Final      Specimen collection should occur prior to Sulfasalazine and/or Sulfapyridine administration due to the potential for falsely depressed results   Alkaline Phosphatase 09/06/2018 83  46 - 116 U/L Final    Total Protein 09/06/2018 7 3  6 4 - 8 2 g/dL Final    Albumin 09/06/2018 3 6  3 5 - 5 0 g/dL Final    Total Bilirubin 09/06/2018 0 68  0 20 - 1 00 mg/dL Final    eGFR 09/06/2018 95  ml/min/1 73sq m Final    TSH 3RD GENERATON 09/06/2018 1 820  0 358 - 3 740 uIU/mL Final    Using supplements with high doses of biotin 20 to more than 300 times greater than the adequate daily intake for adults of 30 mcg/day as established by the [employer], can cause falsely depress results      WBC 09/06/2018 6 80  4 31 - 10 16 Thousand/uL Final    RBC 09/06/2018 4 63  3 81 - 5 12 Million/uL Final    Hemoglobin 09/06/2018 14 0  11 5 - 15 4 g/dL Final    Hematocrit 09/06/2018 44 2  34 8 - 46 1 % Final    MCV 09/06/2018 96  82 - 98 fL Final    MCH 09/06/2018 30 2  26 8 - 34 3 pg Final    MCHC 09/06/2018 31 7  31 4 - 37 4 g/dL Final    RDW 09/06/2018 11 9  11 6 - 15 1 % Final    MPV 09/06/2018 11 2  8 9 - 12 7 fL Final    Platelets 34/08/3859 287  149 - 390 Thousands/uL Final    nRBC 09/06/2018 0  /100 WBCs Final    Neutrophils Relative 09/06/2018 55  43 - 75 % Final    Immat GRANS % 09/06/2018 0  0 - 2 % Final    Lymphocytes Relative 09/06/2018 33  14 - 44 % Final    Monocytes Relative 09/06/2018 9  4 - 12 % Final    Eosinophils Relative 09/06/2018 2  0 - 6 % Final    Basophils Relative 09/06/2018 1  0 - 1 % Final    Neutrophils Absolute 09/06/2018 3 73  1 85 - 7 62 Thousands/µL Final    Immature Grans Absolute 09/06/2018 0 01  0 00 - 0 20 Thousand/uL Final    Lymphocytes Absolute 09/06/2018 2 23  0 60 - 4 47 Thousands/µL Final    Monocytes Absolute 09/06/2018 0 64  0 17 - 1 22 Thousand/µL Final    Eosinophils Absolute 09/06/2018 0 12  0 00 - 0 61 Thousand/µL Final    Basophils Absolute 09/06/2018 0 07  0 00 - 0 10 Thousands/µL Final    Vit D, 25-Hydroxy 09/06/2018 19 6* 30 0 - 100 0 ng/mL Final       Dola Mcardle, MD  [facility]  9/13/2018

## 2018-09-13 NOTE — ASSESSMENT & PLAN NOTE
Despite 2000 IU OTC Vit D  Will Rx Vit D 50,000 IU weekly for 3 months, recheck level and may need to repeat course  If normal can resume 2000 IU OTC daily

## 2018-09-13 NOTE — ASSESSMENT & PLAN NOTE
Now well controlled on valsartan 160 mg and amlodipine 5 mg daily  Pharmacy wants her to switch to losartan for her copay and the recall issues, so will convert valsartan to losartan 50 mg daily  She will continue home BP monitoring with goal <150/<90 discussed  She should return for f/u if goals not maintained with new med  Lab monitoring due annually  Would advise Q6 month BP monitoring in office

## 2018-12-12 ENCOUNTER — APPOINTMENT (OUTPATIENT)
Dept: LAB | Age: 68
End: 2018-12-12
Payer: MEDICARE

## 2018-12-12 DIAGNOSIS — E55.9 VITAMIN D DEFICIENCY: ICD-10-CM

## 2018-12-12 LAB — 25(OH)D3 SERPL-MCNC: 50 NG/ML (ref 30–100)

## 2018-12-12 PROCEDURE — 36415 COLL VENOUS BLD VENIPUNCTURE: CPT

## 2018-12-12 PROCEDURE — 82306 VITAMIN D 25 HYDROXY: CPT

## 2018-12-21 ENCOUNTER — OFFICE VISIT (OUTPATIENT)
Dept: INTERNAL MEDICINE CLINIC | Age: 68
End: 2018-12-21
Payer: MEDICARE

## 2018-12-21 VITALS
DIASTOLIC BLOOD PRESSURE: 88 MMHG | OXYGEN SATURATION: 96 % | WEIGHT: 181.8 LBS | TEMPERATURE: 98.1 F | HEIGHT: 64 IN | SYSTOLIC BLOOD PRESSURE: 130 MMHG | HEART RATE: 68 BPM | BODY MASS INDEX: 31.04 KG/M2

## 2018-12-21 DIAGNOSIS — K21.9 GASTROESOPHAGEAL REFLUX DISEASE WITHOUT ESOPHAGITIS: Primary | ICD-10-CM

## 2018-12-21 DIAGNOSIS — M81.0 AGE-RELATED OSTEOPOROSIS WITHOUT CURRENT PATHOLOGICAL FRACTURE: ICD-10-CM

## 2018-12-21 DIAGNOSIS — E78.5 HYPERLIPIDEMIA, UNSPECIFIED HYPERLIPIDEMIA TYPE: ICD-10-CM

## 2018-12-21 DIAGNOSIS — H61.23 BILATERAL IMPACTED CERUMEN: ICD-10-CM

## 2018-12-21 DIAGNOSIS — I10 BENIGN ESSENTIAL HYPERTENSION: ICD-10-CM

## 2018-12-21 DIAGNOSIS — E55.9 VITAMIN D DEFICIENCY: ICD-10-CM

## 2018-12-21 PROCEDURE — 99214 OFFICE O/P EST MOD 30 MIN: CPT | Performed by: INTERNAL MEDICINE

## 2018-12-21 PROCEDURE — 69210 REMOVE IMPACTED EAR WAX UNI: CPT | Performed by: INTERNAL MEDICINE

## 2018-12-21 RX ORDER — UREA 10 %
1 LOTION (ML) TOPICAL DAILY
Qty: 90 TABLET | Refills: 1 | Status: SHIPPED | OUTPATIENT
Start: 2018-12-21

## 2018-12-21 RX ORDER — LOSARTAN POTASSIUM AND HYDROCHLOROTHIAZIDE 12.5; 1 MG/1; MG/1
1 TABLET ORAL DAILY
Qty: 30 TABLET | Refills: 3 | Status: SHIPPED | OUTPATIENT
Start: 2018-12-21 | End: 2019-04-25 | Stop reason: SDUPTHER

## 2018-12-21 RX ORDER — ALENDRONATE SODIUM 35 MG/1
35 TABLET ORAL
Qty: 12 TABLET | Refills: 1 | Status: SHIPPED | OUTPATIENT
Start: 2018-12-21 | End: 2019-12-03 | Stop reason: SDUPTHER

## 2018-12-21 NOTE — ASSESSMENT & PLAN NOTE
Will order a DEXA scan  Continue with alendronate 35 mg weekly  Also advised she start taking calcium 1200 mg daily  Continue with vitamin-D supplementation, 2000 units daily

## 2018-12-21 NOTE — ASSESSMENT & PLAN NOTE
Vitamin-D level improved, currently at 50  Continue with vitamin-D supplementation with 2000 units daily  Discontinue high-dose ergocalciferol weekly therapy

## 2018-12-21 NOTE — ASSESSMENT & PLAN NOTE
Uncontrolled  She was having lower extremity edema with amlodipine  Will discontinue amlodipine and increase current regimen:  Losartan-hydrochlorothiazide 100-12 5 mg daily

## 2018-12-21 NOTE — PROGRESS NOTES
Assessment/Plan:    Esophageal reflux  Stable  Continue with OTC antacids  Hyperlipidemia  Well controlled on pravastatin 20 mg daily  Bilateral impacted cerumen  Bilateral cerumen removed, tolerated procedure well  Benign essential hypertension  Uncontrolled  She was having lower extremity edema with amlodipine  Will discontinue amlodipine and increase current regimen:  Losartan-hydrochlorothiazide 100-12 5 mg daily  Osteoporosis  Will order a DEXA scan  Continue with alendronate 35 mg weekly  Also advised she start taking calcium 1200 mg daily  Continue with vitamin-D supplementation, 2000 units daily  Vitamin D deficiency  Vitamin-D level improved, currently at 48  Continue with vitamin-D supplementation with 2000 units daily  Discontinue high-dose ergocalciferol weekly therapy  Diagnoses and all orders for this visit:    Gastroesophageal reflux disease without esophagitis    Benign essential hypertension  -     losartan-hydrochlorothiazide (HYZAAR) 100-12 5 MG per tablet; Take 1 tablet by mouth daily    Age-related osteoporosis without current pathological fracture  -     DEXA bone density spine and hip; Future  -     alendronate (FOSAMAX) 35 mg tablet; Take 1 tablet (35 mg total) by mouth every 7 days  -     calcium carbonate (OS-NEISHA) 1250 (500 Ca) MG chewable tablet; Chew 1 tablet (1,250 mg total) daily    Hyperlipidemia, unspecified hyperlipidemia type    Bilateral impacted cerumen    Vitamin D deficiency    Other orders  -     Ear cerumen removal          Subjective:      Patient ID: Verona Helms is a 69 y o  female  59-year-old female is seen today to [facility] care  Medical records and most recent laboratory studies reviewed  She has been compliant with her current medication regimen    She checks her blood pressure regularly at home, although has a wrist blood pressure cuff and has readings averaging of 155/95, however today her blood pressure is stable at 130/88  Immunizations reviewed, she is up-to-date on immunizations  She received her zoster immunization in 2010, has yet to receive Shingrix  She is due for pneumococcal 23 in 09/2019  Last colonoscopy 2013, recommendations are colonoscopy every 5 years  She is now due, follows Dr John Tariq  Hypertension   This is a chronic problem  The current episode started more than 1 year ago  The problem is unchanged  The problem is uncontrolled  Pertinent negatives include no anxiety, blurred vision, chest pain, headaches, malaise/fatigue, neck pain, orthopnea, palpitations, peripheral edema, PND, shortness of breath or sweats  Risk factors for coronary artery disease include dyslipidemia, post-menopausal state and stress  Past treatments include angiotensin blockers and calcium channel blockers  The current treatment provides mild improvement  Compliance problems include exercise  The following portions of the patient's history were reviewed and updated as appropriate: allergies, current medications, past family history, past medical history, past social history, past surgical history and problem list     Review of Systems   Constitutional: Negative for activity change, appetite change, chills, diaphoresis, fatigue, fever and malaise/fatigue  HENT: Negative for congestion, postnasal drip, rhinorrhea, sinus pain, sinus pressure, sneezing and sore throat  Eyes: Negative for blurred vision and visual disturbance  Respiratory: Negative for apnea, cough, choking, chest tightness, shortness of breath and wheezing  Cardiovascular: Negative for chest pain, palpitations, orthopnea, leg swelling and PND  Gastrointestinal: Negative for abdominal distention, abdominal pain, anal bleeding, blood in stool, constipation, diarrhea, nausea and vomiting  Endocrine: Negative for cold intolerance and heat intolerance  Genitourinary: Negative for difficulty urinating, dysuria and hematuria     Musculoskeletal: Negative  Negative for neck pain  Skin: Negative  Neurological: Negative for dizziness, weakness, light-headedness, numbness and headaches  Hematological: Negative for adenopathy  Psychiatric/Behavioral: Negative for agitation, sleep disturbance and suicidal ideas  All other systems reviewed and are negative          Past Medical History:   Diagnosis Date    Acid reflux     Colon polyps     Hypertension     Migraine     Osteoporosis     Vitamin D deficiency 10/14/2013         Current Outpatient Prescriptions:     alendronate (FOSAMAX) 35 mg tablet, Take 1 tablet (35 mg total) by mouth every 7 days, Disp: 12 tablet, Rfl: 1    calcium carbonate (OS-NEISHA) 1250 (500 Ca) MG chewable tablet, Chew 1 tablet (1,250 mg total) daily, Disp: 90 tablet, Rfl: 1    losartan-hydrochlorothiazide (HYZAAR) 100-12 5 MG per tablet, Take 1 tablet by mouth daily, Disp: 30 tablet, Rfl: 3    metroNIDAZOLE (METROCREAM) 0 75 % cream, Apply topically 2 (two) times a day, Disp: , Rfl:     pravastatin (PRAVACHOL) 20 mg tablet, Take 1 tablet (20 mg total) by mouth daily, Disp: 90 tablet, Rfl: 3    Allergies   Allergen Reactions    Silicon Other (See Comments)     The silicon removed her skin    Omeprazole Other (See Comments)    Other Sneezing     Hayfever, red eyes       Social History   Past Surgical History:   Procedure Laterality Date    DILATION AND CURETTAGE OF UTERUS      OTHER SURGICAL HISTORY  03/14/2016    Open Fracture Reduction    SHOULDER SURGERY      TOTAL SHOULDER REPLACEMENT       Family History   Problem Relation Age of Onset    Atrial fibrillation Brother     Colon cancer Family     Coronary artery disease Family     Diabetes Family         Diabetes Mellitus    Hyperlipidemia Family     Hypertension Family     Peripheral vascular disease Family     Stroke Family         Stroke Syndrome    Heart disease Mother     Stroke Father        Objective:  /88 (BP Location: Right arm, Patient Position: Sitting, Cuff Size: Adult)   Pulse 68   Temp 98 1 °F (36 7 °C) (Tympanic)   Ht 5' 4 37" (1 635 m)   Wt 82 5 kg (181 lb 12 8 oz)   SpO2 96%   BMI 30 85 kg/m²     Recent Results (from the past 1344 hour(s))   Vitamin D 25 hydroxy    Collection Time: 12/12/18 12:57 PM   Result Value Ref Range    Vit D, 25-Hydroxy 50 0 30 0 - 100 0 ng/mL            Physical Exam   Constitutional: She is oriented to person, place, and time  She appears well-developed and well-nourished  No distress  HENT:   Head: Normocephalic and atraumatic  Eyes: Pupils are equal, round, and reactive to light  Conjunctivae and EOM are normal  Right eye exhibits no discharge  Left eye exhibits no discharge  Neck: Normal range of motion  Neck supple  No JVD present  No thyromegaly present  Cardiovascular: Normal rate, regular rhythm, normal heart sounds and intact distal pulses  Exam reveals no gallop and no friction rub  No murmur heard  Pulmonary/Chest: Effort normal and breath sounds normal  No respiratory distress  She has no wheezes  She has no rales  She exhibits no tenderness  Abdominal: Soft  She exhibits no distension  There is no tenderness  Musculoskeletal: Normal range of motion  She exhibits no edema, tenderness or deformity  Lymphadenopathy:     She has no cervical adenopathy  Neurological: She is alert and oriented to person, place, and time  No cranial nerve deficit  Coordination normal    Skin: Skin is warm and dry  No rash noted  She is not diaphoretic  No erythema  No pallor  Psychiatric: She has a normal mood and affect  Her behavior is normal  Judgment and thought content normal    Nursing note and vitals reviewed        Ear cerumen removal  Date/Time: 12/21/2018 3:33 PM  Performed by: Sultana Carrizales by: Dagoberto Tavera     Patient location:  Clinic  Consent:     Consent obtained:  Verbal    Consent given by:  Patient    Risks discussed:  Bleeding, dizziness, infection, incomplete removal, pain and TM perforation  Universal protocol:     Patient identity confirmed:  Verbally with patient  Procedure details:     Location:  L ear and R ear    Procedure type: curette      Approach:  External  Post-procedure details:     Complication:  None    Hearing quality:  Normal    Patient tolerance of procedure:   Tolerated well, no immediate complications

## 2019-02-02 ENCOUNTER — HOSPITAL ENCOUNTER (OUTPATIENT)
Dept: RADIOLOGY | Age: 69
Discharge: HOME | End: 2019-02-02
Payer: MEDICARE

## 2019-02-02 DIAGNOSIS — M81.0 AGE-RELATED OSTEOPOROSIS WITHOUT CURRENT PATHOLOGICAL FRACTURE: ICD-10-CM

## 2019-02-02 PROCEDURE — 77080 DXA BONE DENSITY AXIAL: CPT

## 2019-04-08 DIAGNOSIS — I10 BENIGN ESSENTIAL HYPERTENSION: ICD-10-CM

## 2019-04-08 RX ORDER — LOSARTAN POTASSIUM AND HYDROCHLOROTHIAZIDE 12.5; 1 MG/1; MG/1
TABLET ORAL
Qty: 30 TABLET | Refills: 3 | OUTPATIENT
Start: 2019-04-08

## 2019-04-17 DIAGNOSIS — I10 BENIGN ESSENTIAL HYPERTENSION: ICD-10-CM

## 2019-04-18 RX ORDER — LOSARTAN POTASSIUM AND HYDROCHLOROTHIAZIDE 12.5; 1 MG/1; MG/1
TABLET ORAL
Qty: 30 TABLET | Refills: 3 | OUTPATIENT
Start: 2019-04-18

## 2019-04-25 DIAGNOSIS — I10 BENIGN ESSENTIAL HYPERTENSION: ICD-10-CM

## 2019-04-25 RX ORDER — LOSARTAN POTASSIUM AND HYDROCHLOROTHIAZIDE 12.5; 1 MG/1; MG/1
1 TABLET ORAL DAILY
Qty: 30 TABLET | Refills: 3 | Status: SHIPPED | OUTPATIENT
Start: 2019-04-25 | End: 2019-08-28 | Stop reason: SDUPTHER

## 2019-05-22 ENCOUNTER — OFFICE VISIT (OUTPATIENT)
Dept: INTERNAL MEDICINE CLINIC | Age: 69
End: 2019-05-22
Payer: MEDICARE

## 2019-05-22 VITALS
SYSTOLIC BLOOD PRESSURE: 122 MMHG | TEMPERATURE: 97.9 F | BODY MASS INDEX: 31.53 KG/M2 | WEIGHT: 185.8 LBS | HEART RATE: 74 BPM | OXYGEN SATURATION: 95 % | DIASTOLIC BLOOD PRESSURE: 80 MMHG

## 2019-05-22 DIAGNOSIS — E55.9 VITAMIN D DEFICIENCY: ICD-10-CM

## 2019-05-22 DIAGNOSIS — E78.5 HYPERLIPIDEMIA, UNSPECIFIED HYPERLIPIDEMIA TYPE: ICD-10-CM

## 2019-05-22 DIAGNOSIS — I10 BENIGN ESSENTIAL HYPERTENSION: Primary | ICD-10-CM

## 2019-05-22 PROCEDURE — 99214 OFFICE O/P EST MOD 30 MIN: CPT | Performed by: INTERNAL MEDICINE

## 2019-05-22 RX ORDER — PRAVASTATIN SODIUM 20 MG
20 TABLET ORAL DAILY
Qty: 90 TABLET | Refills: 3 | Status: SHIPPED | OUTPATIENT
Start: 2019-05-22 | End: 2020-04-28 | Stop reason: SDUPTHER

## 2019-08-20 DIAGNOSIS — I10 BENIGN ESSENTIAL HYPERTENSION: ICD-10-CM

## 2019-08-20 RX ORDER — LOSARTAN POTASSIUM AND HYDROCHLOROTHIAZIDE 12.5; 1 MG/1; MG/1
TABLET ORAL
Qty: 30 TABLET | Refills: 0 | OUTPATIENT
Start: 2019-08-20

## 2019-08-28 DIAGNOSIS — I10 BENIGN ESSENTIAL HYPERTENSION: ICD-10-CM

## 2019-08-28 RX ORDER — LOSARTAN POTASSIUM AND HYDROCHLOROTHIAZIDE 12.5; 1 MG/1; MG/1
1 TABLET ORAL DAILY
Qty: 30 TABLET | Refills: 2 | Status: SHIPPED | OUTPATIENT
Start: 2019-08-28 | End: 2019-11-15 | Stop reason: SDUPTHER

## 2019-11-15 DIAGNOSIS — I10 BENIGN ESSENTIAL HYPERTENSION: ICD-10-CM

## 2019-11-15 RX ORDER — LOSARTAN POTASSIUM AND HYDROCHLOROTHIAZIDE 12.5; 1 MG/1; MG/1
TABLET ORAL
Qty: 30 TABLET | Refills: 1 | Status: SHIPPED | OUTPATIENT
Start: 2019-11-15 | End: 2019-12-03 | Stop reason: SDUPTHER

## 2019-11-22 ENCOUNTER — APPOINTMENT (OUTPATIENT)
Dept: LAB | Age: 69
End: 2019-11-22
Payer: MEDICARE

## 2019-11-22 DIAGNOSIS — E78.5 HYPERLIPIDEMIA, UNSPECIFIED HYPERLIPIDEMIA TYPE: Primary | ICD-10-CM

## 2019-11-22 DIAGNOSIS — E55.9 VITAMIN D DEFICIENCY: ICD-10-CM

## 2019-11-22 DIAGNOSIS — E78.5 HYPERLIPIDEMIA, UNSPECIFIED HYPERLIPIDEMIA TYPE: ICD-10-CM

## 2019-11-22 LAB
25(OH)D3 SERPL-MCNC: 26.5 NG/ML (ref 30–100)
ALBUMIN SERPL BCP-MCNC: 3.8 G/DL (ref 3.5–5)
ALP SERPL-CCNC: 85 U/L (ref 46–116)
ALT SERPL W P-5'-P-CCNC: 33 U/L (ref 12–78)
ANION GAP SERPL CALCULATED.3IONS-SCNC: 4 MMOL/L (ref 4–13)
AST SERPL W P-5'-P-CCNC: 17 U/L (ref 5–45)
BASOPHILS # BLD AUTO: 0.07 THOUSANDS/ΜL (ref 0–0.1)
BASOPHILS NFR BLD AUTO: 1 % (ref 0–1)
BILIRUB SERPL-MCNC: 1.02 MG/DL (ref 0.2–1)
BILIRUB UR QL STRIP: NEGATIVE
BUN SERPL-MCNC: 18 MG/DL (ref 5–25)
CALCIUM SERPL-MCNC: 10 MG/DL (ref 8.3–10.1)
CHLORIDE SERPL-SCNC: 105 MMOL/L (ref 100–108)
CHOLEST SERPL-MCNC: 195 MG/DL (ref 50–200)
CLARITY UR: CLEAR
CO2 SERPL-SCNC: 30 MMOL/L (ref 21–32)
COLOR UR: YELLOW
CREAT SERPL-MCNC: 0.66 MG/DL (ref 0.6–1.3)
EOSINOPHIL # BLD AUTO: 0.11 THOUSAND/ΜL (ref 0–0.61)
EOSINOPHIL NFR BLD AUTO: 2 % (ref 0–6)
ERYTHROCYTE [DISTWIDTH] IN BLOOD BY AUTOMATED COUNT: 11.9 % (ref 11.6–15.1)
EST. AVERAGE GLUCOSE BLD GHB EST-MCNC: 117 MG/DL
GFR SERPL CREATININE-BSD FRML MDRD: 90 ML/MIN/1.73SQ M
GLUCOSE P FAST SERPL-MCNC: 98 MG/DL (ref 65–99)
GLUCOSE UR STRIP-MCNC: NEGATIVE MG/DL
HBA1C MFR BLD: 5.7 % (ref 4.2–6.3)
HCT VFR BLD AUTO: 46.3 % (ref 34.8–46.1)
HDLC SERPL-MCNC: 56 MG/DL
HGB BLD-MCNC: 14.7 G/DL (ref 11.5–15.4)
HGB UR QL STRIP.AUTO: NEGATIVE
IMM GRANULOCYTES # BLD AUTO: 0.01 THOUSAND/UL (ref 0–0.2)
IMM GRANULOCYTES NFR BLD AUTO: 0 % (ref 0–2)
KETONES UR STRIP-MCNC: NEGATIVE MG/DL
LDLC SERPL CALC-MCNC: 109 MG/DL (ref 0–100)
LEUKOCYTE ESTERASE UR QL STRIP: NEGATIVE
LYMPHOCYTES # BLD AUTO: 2.16 THOUSANDS/ΜL (ref 0.6–4.47)
LYMPHOCYTES NFR BLD AUTO: 35 % (ref 14–44)
MCH RBC QN AUTO: 30.1 PG (ref 26.8–34.3)
MCHC RBC AUTO-ENTMCNC: 31.7 G/DL (ref 31.4–37.4)
MCV RBC AUTO: 95 FL (ref 82–98)
MONOCYTES # BLD AUTO: 0.6 THOUSAND/ΜL (ref 0.17–1.22)
MONOCYTES NFR BLD AUTO: 10 % (ref 4–12)
NEUTROPHILS # BLD AUTO: 3.18 THOUSANDS/ΜL (ref 1.85–7.62)
NEUTS SEG NFR BLD AUTO: 52 % (ref 43–75)
NITRITE UR QL STRIP: NEGATIVE
NONHDLC SERPL-MCNC: 139 MG/DL
NRBC BLD AUTO-RTO: 0 /100 WBCS
PH UR STRIP.AUTO: 7 [PH]
PLATELET # BLD AUTO: 310 THOUSANDS/UL (ref 149–390)
PMV BLD AUTO: 11.4 FL (ref 8.9–12.7)
POTASSIUM SERPL-SCNC: 3.6 MMOL/L (ref 3.5–5.3)
PROT SERPL-MCNC: 7.6 G/DL (ref 6.4–8.2)
PROT UR STRIP-MCNC: NEGATIVE MG/DL
RBC # BLD AUTO: 4.89 MILLION/UL (ref 3.81–5.12)
SODIUM SERPL-SCNC: 139 MMOL/L (ref 136–145)
SP GR UR STRIP.AUTO: 1.02 (ref 1–1.03)
TRIGL SERPL-MCNC: 149 MG/DL
TSH SERPL DL<=0.05 MIU/L-ACNC: 1.68 UIU/ML (ref 0.36–3.74)
UROBILINOGEN UR QL STRIP.AUTO: 0.2 E.U./DL
WBC # BLD AUTO: 6.13 THOUSAND/UL (ref 4.31–10.16)

## 2019-11-22 PROCEDURE — 36415 COLL VENOUS BLD VENIPUNCTURE: CPT

## 2019-11-22 PROCEDURE — 81003 URINALYSIS AUTO W/O SCOPE: CPT | Performed by: INTERNAL MEDICINE

## 2019-11-22 PROCEDURE — 82306 VITAMIN D 25 HYDROXY: CPT

## 2019-11-22 PROCEDURE — 85025 COMPLETE CBC W/AUTO DIFF WBC: CPT

## 2019-11-22 PROCEDURE — 80053 COMPREHEN METABOLIC PANEL: CPT

## 2019-11-22 PROCEDURE — 83036 HEMOGLOBIN GLYCOSYLATED A1C: CPT

## 2019-11-22 PROCEDURE — 84443 ASSAY THYROID STIM HORMONE: CPT

## 2019-11-22 PROCEDURE — 80061 LIPID PANEL: CPT

## 2019-12-03 ENCOUNTER — OFFICE VISIT (OUTPATIENT)
Dept: INTERNAL MEDICINE CLINIC | Age: 69
End: 2019-12-03
Payer: MEDICARE

## 2019-12-03 VITALS
HEART RATE: 80 BPM | OXYGEN SATURATION: 97 % | SYSTOLIC BLOOD PRESSURE: 148 MMHG | BODY MASS INDEX: 30.63 KG/M2 | HEIGHT: 64 IN | TEMPERATURE: 97.7 F | WEIGHT: 179.4 LBS | DIASTOLIC BLOOD PRESSURE: 84 MMHG

## 2019-12-03 DIAGNOSIS — Z11.59 NEED FOR HEPATITIS C SCREENING TEST: Primary | ICD-10-CM

## 2019-12-03 DIAGNOSIS — M81.0 AGE-RELATED OSTEOPOROSIS WITHOUT CURRENT PATHOLOGICAL FRACTURE: ICD-10-CM

## 2019-12-03 DIAGNOSIS — Z13.6 ENCOUNTER FOR ABDOMINAL AORTIC ANEURYSM (AAA) SCREENING: ICD-10-CM

## 2019-12-03 DIAGNOSIS — Z00.00 MEDICARE ANNUAL WELLNESS VISIT, SUBSEQUENT: ICD-10-CM

## 2019-12-03 DIAGNOSIS — I10 BENIGN ESSENTIAL HYPERTENSION: ICD-10-CM

## 2019-12-03 PROCEDURE — 99214 OFFICE O/P EST MOD 30 MIN: CPT | Performed by: INTERNAL MEDICINE

## 2019-12-03 PROCEDURE — G0439 PPPS, SUBSEQ VISIT: HCPCS | Performed by: INTERNAL MEDICINE

## 2019-12-03 RX ORDER — ALENDRONATE SODIUM 35 MG/1
35 TABLET ORAL
Qty: 12 TABLET | Refills: 1 | Status: SHIPPED | OUTPATIENT
Start: 2019-12-03 | End: 2020-04-28 | Stop reason: SDUPTHER

## 2019-12-03 RX ORDER — LOSARTAN POTASSIUM AND HYDROCHLOROTHIAZIDE 12.5; 1 MG/1; MG/1
1 TABLET ORAL DAILY
Qty: 90 TABLET | Refills: 1 | Status: SHIPPED | OUTPATIENT
Start: 2019-12-03 | End: 2019-12-16 | Stop reason: SDUPTHER

## 2019-12-03 NOTE — PATIENT INSTRUCTIONS
Medicare Preventive Visit Patient Instructions  Thank you for completing your Welcome to Medicare Visit or Medicare Annual Wellness Visit today  Your next wellness visit will be due in one year (12/3/2020)  The screening/preventive services that you may require over the next 5-10 years are detailed below  Some tests may not apply to you based off risk factors and/or age  Screening tests ordered at today's visit but not completed yet may show as past due  Also, please note that scanned in results may not display below  Preventive Screenings:  Service Recommendations Previous Testing/Comments   Colorectal Cancer Screening  * Colonoscopy    * Fecal Occult Blood Test (FOBT)/Fecal Immunochemical Test (FIT)  * Fecal DNA/Cologuard Test  * Flexible Sigmoidoscopy Age: 54-65 years old   Colonoscopy: every 10 years (may be performed more frequently if at higher risk)  OR  FOBT/FIT: every 1 year  OR  Cologuard: every 3 years  OR  Sigmoidoscopy: every 5 years  Screening may be recommended earlier than age 48 if at higher risk for colorectal cancer  Also, an individualized decision between you and your healthcare provider will decide whether screening between the ages of 74-80 would be appropriate  Colonoscopy: 04/24/2013  FOBT/FIT: Not on file  Cologuard: Not on file  Sigmoidoscopy: Not on file    Screening Current     Breast Cancer Screening Age: 36 years old  Frequency: every 1-2 years  Not required if history of left and right mastectomy Mammogram: 01/24/2018    Screening Current   Cervical Cancer Screening Between the ages of 21-29, pap smear recommended once every 3 years  Between the ages of 33-67, can perform pap smear with HPV co-testing every 5 years     Recommendations may differ for women with a history of total hysterectomy, cervical cancer, or abnormal pap smears in past  Pap Smear: Not on file    Screening Not Indicated   Hepatitis C Screening Once for adults born between 1945 and 1965  More frequently in patients at high risk for Hepatitis C Hep C Antibody: Not on file       Diabetes Screening 1-2 times per year if you're at risk for diabetes or have pre-diabetes Fasting glucose: 98 mg/dL   A1C: 5 7 %    Screening Current   Cholesterol Screening Once every 5 years if you don't have a lipid disorder  May order more often based on risk factors  Lipid panel: 11/22/2019    Screening Not Indicated  History Lipid Disorder     Other Preventive Screenings Covered by Medicare:  1  Abdominal Aortic Aneurysm (AAA) Screening: covered once if your at risk  You're considered to be at risk if you have a family history of AAA  2  Lung Cancer Screening: covers low dose CT scan once per year if you meet all of the following conditions: (1) Age 50-69; (2) No signs or symptoms of lung cancer; (3) Current smoker or have quit smoking within the last 15 years; (4) You have a tobacco smoking history of at least 30 pack years (packs per day multiplied by number of years you smoked); (5) You get a written order from a healthcare provider  3  Glaucoma Screening: covered annually if you're considered high risk: (1) You have diabetes OR (2) Family history of glaucoma OR (3)  aged 48 and older OR (3)  American aged 72 and older  3  Osteoporosis Screening: covered every 2 years if you meet one of the following conditions: (1) You're estrogen deficient and at risk for osteoporosis based off medical history and other findings; (2) Have a vertebral abnormality; (3) On glucocorticoid therapy for more than 3 months; (4) Have primary hyperparathyroidism; (5) On osteoporosis medications and need to assess response to drug therapy  · Last bone density test (DXA Scan): 02/02/2019   5  HIV Screening: covered annually if you're between the age of 15-65  Also covered annually if you are younger than 13 and older than 72 with risk factors for HIV infection   For pregnant patients, it is covered up to 3 times per pregnancy  Immunizations:  Immunization Recommendations   Influenza Vaccine Annual influenza vaccination during flu season is recommended for all persons aged >= 6 months who do not have contraindications   Pneumococcal Vaccine (Prevnar and Pneumovax)  * Prevnar = PCV13  * Pneumovax = PPSV23   Adults 25-60 years old: 1-3 doses may be recommended based on certain risk factors  Adults 72 years old: Prevnar (PCV13) vaccine recommended followed by Pneumovax (PPSV23) vaccine  If already received PPSV23 since turning 65, then PCV13 recommended at least one year after PPSV23 dose  Hepatitis B Vaccine 3 dose series if at intermediate or high risk (ex: diabetes, end stage renal disease, liver disease)   Tetanus (Td) Vaccine - COST NOT COVERED BY MEDICARE PART B Following completion of primary series, a booster dose should be given every 10 years to maintain immunity against tetanus  Td may also be given as tetanus wound prophylaxis  Tdap Vaccine - COST NOT COVERED BY MEDICARE PART B Recommended at least once for all adults  For pregnant patients, recommended with each pregnancy  Shingles Vaccine (Shingrix) - COST NOT COVERED BY MEDICARE PART B  2 shot series recommended in those aged 48 and above     Health Maintenance Due:      Topic Date Due    Hepatitis C Screening  1950    MAMMOGRAM  01/24/2020    CRC Screening: Colonoscopy  04/24/2023     Immunizations Due:  There are no preventive care reminders to display for this patient  Advance Directives   What are advance directives? Advance directives are legal documents that state your wishes and plans for medical care  These plans are made ahead of time in case you lose your ability to make decisions for yourself  Advance directives can apply to any medical decision, such as the treatments you want, and if you want to donate organs  What are the types of advance directives?   There are many types of advance directives, and each state has rules about how to use them  You may choose a combination of any of the following:  · Living will: This is a written record of the treatment you want  You can also choose which treatments you do not want, which to limit, and which to stop at a certain time  This includes surgery, medicine, IV fluid, and tube feedings  · Durable power of  for healthcare [facility]): This is a written record that states who you want to make healthcare choices for you when you are unable to make them for yourself  This person, called a proxy, is usually a family member or a friend  You may choose more than 1 proxy  · Do not resuscitate (DNR) order:  A DNR order is used in case your heart stops beating or you stop breathing  It is a request not to have certain forms of treatment, such as CPR  A DNR order may be included in other types of advance directives  · Medical directive: This covers the care that you want if you are in a coma, near death, or unable to make decisions for yourself  You can list the treatments you want for each condition  Treatment may include pain medicine, surgery, blood transfusions, dialysis, IV or tube feedings, and a ventilator (breathing machine)  · Values history: This document has questions about your views, beliefs, and how you feel and think about life  This information can help others choose the care that you would choose  Why are advance directives important? An advance directive helps you control your care  Although spoken wishes may be used, it is better to have your wishes written down  Spoken wishes can be misunderstood, or not followed  Treatments may be given even if you do not want them  An advance directive may make it easier for your family to make difficult choices about your care  Weight Management   Why it is important to manage your weight:  Being overweight increases your risk of health conditions such as heart disease, high blood pressure, type 2 diabetes, and certain types of cancer   It can also increase your risk for osteoarthritis, sleep apnea, and other respiratory problems  Aim for a slow, steady weight loss  Even a small amount of weight loss can lower your risk of health problems  How to lose weight safely:  A safe and healthy way to lose weight is to eat fewer calories and get regular exercise  You can lose up about 1 pound a week by decreasing the number of calories you eat by 500 calories each day  Healthy meal plan for weight management:  A healthy meal plan includes a variety of foods, contains fewer calories, and helps you stay healthy  A healthy meal plan includes the following:  · Eat whole-grain foods more often  A healthy meal plan should contain fiber  Fiber is the part of grains, fruits, and vegetables that is not broken down by your body  Whole-grain foods are healthy and provide extra fiber in your diet  Some examples of whole-grain foods are whole-wheat breads and pastas, oatmeal, brown rice, and bulgur  · Eat a variety of vegetables every day  Include dark, leafy greens such as spinach, kale, tristen greens, and mustard greens  Eat yellow and orange vegetables such as carrots, sweet potatoes, and winter squash  · Eat a variety of fruits every day  Choose fresh or canned fruit (canned in its own juice or light syrup) instead of juice  Fruit juice has very little or no fiber  · Eat low-fat dairy foods  Drink fat-free (skim) milk or 1% milk  Eat fat-free yogurt and low-fat cottage cheese  Try low-fat cheeses such as mozzarella and other reduced-fat cheeses  · Choose meat and other protein foods that are low in fat  Choose beans or other legumes such as split peas or lentils  Choose fish, skinless poultry (chicken or turkey), or lean cuts of red meat (beef or pork)  Before you cook meat or poultry, cut off any visible fat  · Use less fat and oil  Try baking foods instead of frying them   Add less fat, such as margarine, sour cream, regular salad dressing and mayonnaise to foods  Eat fewer high-fat foods  Some examples of high-fat foods include french fries, doughnuts, ice cream, and cakes  · Eat fewer sweets  Limit foods and drinks that are high in sugar  This includes candy, cookies, regular soda, and sweetened drinks  Exercise:  Exercise at least 30 minutes per day on most days of the week  Some examples of exercise include walking, biking, dancing, and swimming  You can also fit in more physical activity by taking the stairs instead of the elevator or parking farther away from stores  Ask your healthcare provider about the best exercise plan for you  © Copyright [employer] 2018 Information is for End User's use only and may not be sold, redistributed or otherwise used for commercial purposes   All illustrations and images included in CareNotes® are the copyrighted property of [employer] M [employer]  or [address] [place] [address]

## 2019-12-03 NOTE — PROGRESS NOTES
Assessment/Plan:  No problem-specific Assessment & Plan notes found for this encounter  BMI Counseling: Body mass index is 30 44 kg/m²  The BMI is above normal  Nutrition recommendations include decreasing portion sizes, encouraging healthy choices of fruits and vegetables, decreasing fast food intake, consuming healthier snacks, moderation in carbohydrate intake and increasing intake of lean protein  Exercise recommendations include exercising 3-5 times per week  Diagnoses and all orders for this visit:    Need for hepatitis C screening test  -     Hepatitis C antibody; Future    Benign essential hypertension  -     losartan-hydrochlorothiazide (HYZAAR) 100-12 5 MG per tablet; Take 1 tablet by mouth daily    Age-related osteoporosis without current pathological fracture  -     alendronate (FOSAMAX) 35 mg tablet; Take 1 tablet (35 mg total) by mouth every 7 days    Encounter for abdominal aortic aneurysm (AAA) screening  -     US abdominal aorta; Future        Subjective:   Chief Complaint   Patient presents with    Medicare Wellness Visit     initial [facility]    health maint     Due for BMI FOLLOW UP    Follow-up     HTN  Review labs         Patient ID: Suleiman Monet is a 77 y o  female  HPI  This is a very pleasant 77 years young lady who is here for the regular follow-up and wellness visit he is doing well under lot of stress because of the disease process of her  who is totally dependent on her right now sometimes she is not able to get sleep and rest well I discussed with her that help is available if she needs she understand that she denying any chest pain shortness of breath or any other symptoms except for the knee pain she is doing well her blood pressure is reasonably controlled could be better her cholesterol is slightly high specially LDL cholesterol    She lost about the 6 lb since the last office visit will continue with the same medication and follow her up in about 4 months she is advised to give me a call in case if she needs more help related to her  illness  The following portions of the patient's history were reviewed and updated as appropriate: allergies, current medications, past family history, past medical history, past social history, past surgical history and problem list     Review of Systems   Constitutional: Negative for chills and fatigue  HENT: Negative for congestion, ear pain, hearing loss, postnasal drip, sinus pressure, sore throat and voice change  Eyes: Negative for pain, discharge and visual disturbance  Respiratory: Negative for cough, chest tightness and shortness of breath  Cardiovascular: Negative for chest pain, palpitations and leg swelling  Gastrointestinal: Negative for abdominal pain, blood in stool, diarrhea, nausea and rectal pain  Genitourinary: Negative for difficulty urinating, dysuria and urgency  Musculoskeletal: Negative for arthralgias and joint swelling  Right knee pain         Skin: Negative for rash  Allergic/Immunologic: Negative for environmental allergies and food allergies  Neurological: Negative for dizziness, tremors, weakness, numbness and headaches  Hematological: Negative for adenopathy  Psychiatric/Behavioral: Negative for behavioral problems and hallucinations           Past Medical History:   Diagnosis Date    Acid reflux     Colon polyps     Hypertension     Migraine     Osteoporosis     Vitamin D deficiency 10/14/2013         Current Outpatient Medications:     alendronate (FOSAMAX) 35 mg tablet, Take 1 tablet (35 mg total) by mouth every 7 days, Disp: 12 tablet, Rfl: 1    calcium carbonate (OS-NEISHA) 1250 (500 Ca) MG chewable tablet, Chew 1 tablet (1,250 mg total) daily, Disp: 90 tablet, Rfl: 1    losartan-hydrochlorothiazide (HYZAAR) 100-12 5 MG per tablet, Take 1 tablet by mouth daily, Disp: 90 tablet, Rfl: 1    metroNIDAZOLE (METROCREAM) 0 75 % cream, Apply topically 2 (two) times a day, Disp: , Rfl:     pravastatin (PRAVACHOL) 20 mg tablet, Take 1 tablet (20 mg total) by mouth daily, Disp: 90 tablet, Rfl: 3    Allergies   Allergen Reactions    Silicon Other (See Comments)     The silicon removed her skin    Omeprazole Other (See Comments)    Other Sneezing     Hayfever, red eyes       Social History   Past Surgical History:   Procedure Laterality Date    DILATION AND CURETTAGE OF UTERUS      OTHER SURGICAL HISTORY  03/14/2016    Open Fracture Reduction    SHOULDER SURGERY      TOTAL SHOULDER REPLACEMENT       Family History   Problem Relation Age of Onset    Atrial fibrillation Brother     Colon cancer Family     Coronary artery disease Family     Diabetes Family         Diabetes Mellitus    Hyperlipidemia Family     Hypertension Family     Peripheral vascular disease Family     Stroke Family         Stroke Syndrome    Heart disease Mother     Stroke Father        Objective:  /84 (BP Location: Left arm, Patient Position: Sitting, Cuff Size: Standard)   Pulse 80   Temp 97 7 °F (36 5 °C) (Tympanic)   Ht 5' 4 37" (1 635 m)   Wt 81 4 kg (179 lb 6 4 oz)   SpO2 97%   BMI 30 44 kg/m²        Physical Exam   Constitutional: She is oriented to person, place, and time  She appears well-developed  HENT:   Head: Normocephalic  Eyes: Pupils are equal, round, and reactive to light  Neck: Normal range of motion  Cardiovascular: Intact distal pulses  No murmur heard  Pulmonary/Chest: Breath sounds normal    Abdominal: Bowel sounds are normal    Musculoskeletal: She exhibits edema  She exhibits no tenderness  Neurological: She is alert and oriented to person, place, and time  Skin: Skin is warm           Recent Results (from the past 672 hour(s))   CBC and differential    Collection Time: 11/22/19  9:29 AM   Result Value Ref Range    WBC 6 13 4 31 - 10 16 Thousand/uL    RBC 4 89 3 81 - 5 12 Million/uL    Hemoglobin 14 7 11 5 - 15 4 g/dL    Hematocrit 46 3 (H) 34 8 - 46 1 %    MCV 95 82 - 98 fL    MCH 30 1 26 8 - 34 3 pg    MCHC 31 7 31 4 - 37 4 g/dL    RDW 11 9 11 6 - 15 1 %    MPV 11 4 8 9 - 12 7 fL    Platelets 119 165 - 592 Thousands/uL    nRBC 0 /100 WBCs    Neutrophils Relative 52 43 - 75 %    Immat GRANS % 0 0 - 2 %    Lymphocytes Relative 35 14 - 44 %    Monocytes Relative 10 4 - 12 %    Eosinophils Relative 2 0 - 6 %    Basophils Relative 1 0 - 1 %    Neutrophils Absolute 3 18 1 85 - 7 62 Thousands/µL    Immature Grans Absolute 0 01 0 00 - 0 20 Thousand/uL    Lymphocytes Absolute 2 16 0 60 - 4 47 Thousands/µL    Monocytes Absolute 0 60 0 17 - 1 22 Thousand/µL    Eosinophils Absolute 0 11 0 00 - 0 61 Thousand/µL    Basophils Absolute 0 07 0 00 - 0 10 Thousands/µL   TSH, 3rd generation with Free T4 reflex    Collection Time: 11/22/19  9:29 AM   Result Value Ref Range    TSH 3RD GENERATON 1 680 0 358 - 3 740 uIU/mL   Vitamin D 25 hydroxy    Collection Time: 11/22/19  9:29 AM   Result Value Ref Range    Vit D, 25-Hydroxy 26 5 (L) 30 0 - 100 0 ng/mL   Comprehensive metabolic panel    Collection Time: 11/22/19  9:29 AM   Result Value Ref Range    Sodium 139 136 - 145 mmol/L    Potassium 3 6 3 5 - 5 3 mmol/L    Chloride 105 100 - 108 mmol/L    CO2 30 21 - 32 mmol/L    ANION GAP 4 4 - 13 mmol/L    BUN 18 5 - 25 mg/dL    Creatinine 0 66 0 60 - 1 30 mg/dL    Glucose, Fasting 98 65 - 99 mg/dL    Calcium 10 0 8 3 - 10 1 mg/dL    AST 17 5 - 45 U/L    ALT 33 12 - 78 U/L    Alkaline Phosphatase 85 46 - 116 U/L    Total Protein 7 6 6 4 - 8 2 g/dL    Albumin 3 8 3 5 - 5 0 g/dL    Total Bilirubin 1 02 (H) 0 20 - 1 00 mg/dL    eGFR 90 ml/min/1 73sq m   Hemoglobin A1C    Collection Time: 11/22/19  9:29 AM   Result Value Ref Range    Hemoglobin A1C 5 7 4 2 - 6 3 %     mg/dl   Lipid panel    Collection Time: 11/22/19  9:29 AM   Result Value Ref Range    Cholesterol 195 50 - 200 mg/dL    Triglycerides 149 <=150 mg/dL    HDL, Direct 56 >=40 mg/dL    LDL Calculated 109 (H) 0 - 100 mg/dL Non-HDL-Chol (CHOL-HDL) 139 mg/dl   UA w Reflex to Microscopic w Reflex to Culture    Collection Time: 11/22/19 10:51 AM   Result Value Ref Range    Color, UA Yellow     Clarity, UA Clear     Specific Gravity, UA 1 020 1 003 - 1 030    pH, UA 7 0 4 5, 5 0, 5 5, 6 0, 6 5, 7 0, 7 5, 8 0    Leukocytes, UA Negative Negative    Nitrite, UA Negative Negative    Protein, UA Negative Negative mg/dl    Glucose, UA Negative Negative mg/dl    Ketones, UA Negative Negative mg/dl    Urobilinogen, UA 0 2 0 2, 1 0 E U /dl E U /dl    Bilirubin, UA Negative Negative    Blood, UA Negative Negative

## 2019-12-03 NOTE — PROGRESS NOTES
Assessment and Plan:     Problem List Items Addressed This Visit     None           Preventive health issues were discussed with patient, and age appropriate screening tests were ordered as noted in patient's After Visit Summary  Personalized health advice and appropriate referrals for health education or preventive services given if needed, as noted in patient's After Visit Summary       History of Present Illness:     Patient presents for Medicare Annual Wellness visit    Patient Care Team:  Rita Bowman MD as PCP - General (Internal Medicine)     Problem List:     Patient Active Problem List   Diagnosis    Allergic rhinitis    Benign essential hypertension    Common migraine without aura    Esophageal reflux    Hyperlipidemia    Lipoma of neck    Osteoporosis    Lower back pain    Rosacea    Right knee pain    Imbalance    Vitamin D deficiency    Bilateral impacted cerumen      Past Medical and Surgical History:     Past Medical History:   Diagnosis Date    Acid reflux     Colon polyps     Hypertension     Migraine     Osteoporosis     Vitamin D deficiency 10/14/2013     Past Surgical History:   Procedure Laterality Date    DILATION AND CURETTAGE OF UTERUS      OTHER SURGICAL HISTORY  03/14/2016    Open Fracture Reduction    SHOULDER SURGERY      TOTAL SHOULDER REPLACEMENT        Family History:     Family History   Problem Relation Age of Onset    Atrial fibrillation Brother     Colon cancer Family     Coronary artery disease Family     Diabetes Family         Diabetes Mellitus    Hyperlipidemia Family     Hypertension Family     Peripheral vascular disease Family     Stroke Family         Stroke Syndrome    Heart disease Mother     Stroke Father       Social History:     Social History     Socioeconomic History    Marital status: /Civil Union     Spouse name: Not on file    Number of children: Not on file    Years of education: Not on file    Highest education level: Not on file   Occupational History    Not on file   Social Needs    Financial resource strain: Not on file    Food insecurity:     Worry: Not on file     Inability: Not on file    Transportation needs:     Medical: Not on file     Non-medical: Not on file   Tobacco Use    Smoking status: Former Smoker    Smokeless tobacco: Never Used    Tobacco comment: Quite 25 yrs ago - Former heavy smoker; 2-3 ppd   Substance and Sexual Activity    Alcohol use: No    Drug use: Never    Sexual activity: Not on file   Lifestyle    Physical activity:     Days per week: Not on file     Minutes per session: Not on file    Stress: Not on file   Relationships    Social connections:     Talks on phone: Not on file     Gets together: Not on file     Attends Voodoo service: Not on file     Active member of club or organization: Not on file     Attends meetings of clubs or organizations: Not on file     Relationship status: Not on file    Intimate partner violence:     Fear of current or ex partner: Not on file     Emotionally abused: Not on file     Physically abused: Not on file     Forced sexual activity: Not on file   Other Topics Concern    Not on file   Social History Narrative    Not on file       Medications and Allergies:     Current Outpatient Medications   Medication Sig Dispense Refill    alendronate (FOSAMAX) 35 mg tablet Take 1 tablet (35 mg total) by mouth every 7 days 12 tablet 1    calcium carbonate (OS-NEISHA) 1250 (500 Ca) MG chewable tablet Chew 1 tablet (1,250 mg total) daily 90 tablet 1    losartan-hydrochlorothiazide (HYZAAR) 100-12 5 MG per tablet TAKE ONE TABLET BY MOUTH ONE TIME DAILY  30 tablet 1    metroNIDAZOLE (METROCREAM) 0 75 % cream Apply topically 2 (two) times a day      pravastatin (PRAVACHOL) 20 mg tablet Take 1 tablet (20 mg total) by mouth daily 90 tablet 3     No current facility-administered medications for this visit        Allergies   Allergen Reactions    Silicon Other (See Comments)     The silicon removed her skin    Omeprazole Other (See Comments)    Other Sneezing     Hayfever, red eyes      Immunizations:     Immunization History   Administered Date(s) Administered     Influenza (IM) Preservative Free 10/03/2016    INFLUENZA 10/21/2015, 10/15/2018    Influenza Split High Dose Preservative Free IM 10/07/2017, 10/21/2018    Pneumococcal Conjugate 13-Valent 09/13/2018    Pneumococcal Polysaccharide PPV23 06/12/2014    Tdap 02/04/2010    Zoster 01/14/2010    Zoster Vaccine Recombinant 01/14/2010      Health Maintenance:         Topic Date Due    Hepatitis C Screening  1950    MAMMOGRAM  01/24/2020    CRC Screening: Colonoscopy  04/24/2023         Topic Date Due    Influenza Vaccine  07/01/2019      Medicare Health Risk Assessment:     There were no vitals taken for this visit  Sonya is here for her Initial Wellness visit  Last Medicare Wellness visit information reviewed, patient interviewed and updates made to the record today  Health Risk Assessment:   Patient rates overall health as excellent  Patient feels that their physical health rating is same  Eyesight was rated as same  Hearing was rated as same  Patient feels that their emotional and mental health rating is same  Pain experienced in the last 7 days has been some  Patient's pain rating has been 8/10  Patient states that she has experienced no weight loss or gain in last 6 months  Depression Screening:   PHQ-2 Score: 0      Fall Risk Screening: In the past year, patient has experienced: no history of falling in past year      Urinary Incontinence Screening:   Patient has leaked urine accidently in the last six months  Home Safety:  Patient does not have trouble with stairs inside or outside of their home  Patient has working smoke alarms and has no working carbon monoxide detector  Home safety hazards include: none       Nutrition:   Current diet is Regular, No Added Salt and Limited junk food  Medications:   Patient is currently taking over-the-counter supplements  OTC medications include: see medication list  Patient is able to manage medications  Activities of Daily Living (ADLs)/Instrumental Activities of Daily Living (IADLs):   Walk and transfer into and out of bed and chair?: Yes  Dress and groom yourself?: Yes    Bathe or shower yourself?: Yes    Feed yourself? Yes  Do your laundry/housekeeping?: Yes  Manage your money, pay your bills and track your expenses?: Yes  Make your own meals?: Yes    Do your own shopping?: Yes    Previous Hospitalizations:   Any hospitalizations or ED visits within the last 12 months?: Yes      Advance Care Planning:   Living will: Yes    Durable POA for healthcare: Yes    Advanced directive: Yes    Advanced directive counseling given: Yes    End of Life Decisions reviewed with patient: Yes    Provider agrees with end of life decisions: Yes      PREVENTIVE SCREENINGS      Cardiovascular Screening:    General: Screening Not Indicated and History Lipid Disorder      Diabetes Screening:     General: Screening Current      Colorectal Cancer Screening:     General: Screening Current      Breast Cancer Screening:     General: Screening Current      Cervical Cancer Screening:    General: Screening Not Indicated      Osteoporosis Screening:    General: Screening Not Indicated and History Osteoporosis      Abdominal Aortic Aneurysm (AAA) Screening:      Due for: Screening AAA Ultrasound      Lung Cancer Screening:     General: Screening Not Indicated      Hepatitis C Screening:      Hep C Screening Accepted: Yes        Preventive Screening Comments: Patient is smoked for 15 years almost 2 packs per day    She was supposed to get a colonoscopy but so far she can find anybody to take her and bring her back and she has pretty much busy in taking care of her  but she will find someone and get it done    Other Counseling Topics:   Car/seat belt/driving safety, skin self-exam, sunscreen and calcium and vitamin D intake and regular weightbearing exercise         Patrick Garibay MD

## 2019-12-05 ENCOUNTER — APPOINTMENT (OUTPATIENT)
Dept: LAB | Age: 69
End: 2019-12-05
Payer: MEDICARE

## 2019-12-05 DIAGNOSIS — Z11.59 NEED FOR HEPATITIS C SCREENING TEST: ICD-10-CM

## 2019-12-05 LAB — HCV AB SER QL: NORMAL

## 2019-12-05 PROCEDURE — 86803 HEPATITIS C AB TEST: CPT

## 2019-12-05 PROCEDURE — 36415 COLL VENOUS BLD VENIPUNCTURE: CPT

## 2019-12-09 ENCOUNTER — HOSPITAL ENCOUNTER (OUTPATIENT)
Dept: RADIOLOGY | Age: 69
Discharge: HOME | End: 2019-12-09
Payer: MEDICARE

## 2019-12-09 DIAGNOSIS — Z13.6 ENCOUNTER FOR ABDOMINAL AORTIC ANEURYSM (AAA) SCREENING: ICD-10-CM

## 2019-12-09 PROCEDURE — 76706 US ABDL AORTA SCREEN AAA: CPT

## 2019-12-16 DIAGNOSIS — I10 BENIGN ESSENTIAL HYPERTENSION: Primary | ICD-10-CM

## 2019-12-16 RX ORDER — LOSARTAN POTASSIUM 100 MG/1
100 TABLET ORAL DAILY
Qty: 90 TABLET | Refills: 1 | Status: SHIPPED | OUTPATIENT
Start: 2019-12-16 | End: 2020-04-28 | Stop reason: SDUPTHER

## 2019-12-16 RX ORDER — HYDROCHLOROTHIAZIDE 12.5 MG/1
12.5 CAPSULE, GELATIN COATED ORAL DAILY
Qty: 90 CAPSULE | Refills: 1 | Status: SHIPPED | OUTPATIENT
Start: 2019-12-16 | End: 2020-04-28 | Stop reason: SDUPTHER

## 2019-12-16 RX ORDER — HYDROCHLOROTHIAZIDE 12.5 MG/1
12.5 CAPSULE, GELATIN COATED ORAL DAILY
COMMUNITY
End: 2019-12-16 | Stop reason: SDUPTHER

## 2019-12-16 RX ORDER — LOSARTAN POTASSIUM 100 MG/1
100 TABLET ORAL DAILY
COMMUNITY
End: 2019-12-16 | Stop reason: SDUPTHER

## 2019-12-16 NOTE — TELEPHONE ENCOUNTER
Patient's Hyzaar on backorder and pharmacy asking if we can order the separate medications (losartan and HCTZ)

## 2020-04-28 ENCOUNTER — TELEMEDICINE (OUTPATIENT)
Dept: INTERNAL MEDICINE CLINIC | Age: 70
End: 2020-04-28
Payer: MEDICARE

## 2020-04-28 VITALS
OXYGEN SATURATION: 95 % | TEMPERATURE: 98.1 F | BODY MASS INDEX: 30.37 KG/M2 | SYSTOLIC BLOOD PRESSURE: 136 MMHG | DIASTOLIC BLOOD PRESSURE: 86 MMHG | WEIGHT: 179 LBS | HEART RATE: 86 BPM

## 2020-04-28 DIAGNOSIS — R22.32 LUMP OF LEFT WRIST: ICD-10-CM

## 2020-04-28 DIAGNOSIS — E78.5 HYPERLIPIDEMIA, UNSPECIFIED HYPERLIPIDEMIA TYPE: ICD-10-CM

## 2020-04-28 DIAGNOSIS — I10 BENIGN ESSENTIAL HYPERTENSION: ICD-10-CM

## 2020-04-28 DIAGNOSIS — Z12.31 ENCOUNTER FOR SCREENING MAMMOGRAM FOR MALIGNANT NEOPLASM OF BREAST: Primary | ICD-10-CM

## 2020-04-28 DIAGNOSIS — M81.0 AGE-RELATED OSTEOPOROSIS WITHOUT CURRENT PATHOLOGICAL FRACTURE: ICD-10-CM

## 2020-04-28 PROCEDURE — 1036F TOBACCO NON-USER: CPT | Performed by: INTERNAL MEDICINE

## 2020-04-28 PROCEDURE — 3079F DIAST BP 80-89 MM HG: CPT | Performed by: INTERNAL MEDICINE

## 2020-04-28 PROCEDURE — 3075F SYST BP GE 130 - 139MM HG: CPT | Performed by: INTERNAL MEDICINE

## 2020-04-28 PROCEDURE — 4040F PNEUMOC VAC/ADMIN/RCVD: CPT | Performed by: INTERNAL MEDICINE

## 2020-04-28 PROCEDURE — 1160F RVW MEDS BY RX/DR IN RCRD: CPT | Performed by: INTERNAL MEDICINE

## 2020-04-28 PROCEDURE — 99214 OFFICE O/P EST MOD 30 MIN: CPT | Performed by: INTERNAL MEDICINE

## 2020-04-28 RX ORDER — ALENDRONATE SODIUM 35 MG/1
35 TABLET ORAL
Qty: 12 TABLET | Refills: 1 | Status: SHIPPED | OUTPATIENT
Start: 2020-04-28 | End: 2020-08-04 | Stop reason: SDUPTHER

## 2020-04-28 RX ORDER — LOSARTAN POTASSIUM 100 MG/1
100 TABLET ORAL DAILY
Qty: 90 TABLET | Refills: 1 | Status: SHIPPED | OUTPATIENT
Start: 2020-04-28 | End: 2021-03-11 | Stop reason: SDUPTHER

## 2020-04-28 RX ORDER — PRAVASTATIN SODIUM 20 MG
20 TABLET ORAL DAILY
Qty: 90 TABLET | Refills: 1 | Status: SHIPPED | OUTPATIENT
Start: 2020-04-28 | End: 2021-01-11

## 2020-04-28 RX ORDER — HYDROCHLOROTHIAZIDE 12.5 MG/1
12.5 CAPSULE, GELATIN COATED ORAL DAILY
Qty: 90 CAPSULE | Refills: 1 | Status: SHIPPED | OUTPATIENT
Start: 2020-04-28 | End: 2020-08-04 | Stop reason: ALTCHOICE

## 2020-05-05 ENCOUNTER — OFFICE VISIT (OUTPATIENT)
Dept: INTERNAL MEDICINE CLINIC | Age: 70
End: 2020-05-05
Payer: MEDICARE

## 2020-05-05 VITALS
HEIGHT: 64 IN | TEMPERATURE: 98.3 F | DIASTOLIC BLOOD PRESSURE: 78 MMHG | HEART RATE: 68 BPM | WEIGHT: 183.6 LBS | OXYGEN SATURATION: 96 % | SYSTOLIC BLOOD PRESSURE: 154 MMHG | BODY MASS INDEX: 31.34 KG/M2

## 2020-05-05 DIAGNOSIS — R22.32 LUMP OF LEFT WRIST: Primary | ICD-10-CM

## 2020-05-05 PROCEDURE — 1160F RVW MEDS BY RX/DR IN RCRD: CPT | Performed by: INTERNAL MEDICINE

## 2020-05-05 PROCEDURE — 4040F PNEUMOC VAC/ADMIN/RCVD: CPT | Performed by: INTERNAL MEDICINE

## 2020-05-05 PROCEDURE — 3008F BODY MASS INDEX DOCD: CPT | Performed by: INTERNAL MEDICINE

## 2020-05-05 PROCEDURE — 3078F DIAST BP <80 MM HG: CPT | Performed by: INTERNAL MEDICINE

## 2020-05-05 PROCEDURE — 99213 OFFICE O/P EST LOW 20 MIN: CPT | Performed by: INTERNAL MEDICINE

## 2020-05-05 PROCEDURE — 1036F TOBACCO NON-USER: CPT | Performed by: INTERNAL MEDICINE

## 2020-05-05 PROCEDURE — 3077F SYST BP >= 140 MM HG: CPT | Performed by: INTERNAL MEDICINE

## 2020-05-05 RX ORDER — MELOXICAM 15 MG/1
15 TABLET ORAL DAILY
COMMUNITY
Start: 2020-04-30 | End: 2020-08-04 | Stop reason: ALTCHOICE

## 2020-05-22 ENCOUNTER — HOSPITAL ENCOUNTER (OUTPATIENT)
Dept: RADIOLOGY | Age: 70
Discharge: HOME | End: 2020-05-22
Payer: MEDICARE

## 2020-05-22 DIAGNOSIS — Z12.31 ENCOUNTER FOR SCREENING MAMMOGRAM FOR MALIGNANT NEOPLASM OF BREAST: ICD-10-CM

## 2020-05-22 PROCEDURE — 77067 SCR MAMMO BI INCL CAD: CPT

## 2020-05-22 PROCEDURE — 77063 BREAST TOMOSYNTHESIS BI: CPT

## 2020-06-02 DIAGNOSIS — I10 BENIGN ESSENTIAL HYPERTENSION: Primary | ICD-10-CM

## 2020-06-02 RX ORDER — HYDROCHLOROTHIAZIDE 12.5 MG/1
TABLET ORAL
Qty: 90 TABLET | Refills: 0 | Status: SHIPPED | OUTPATIENT
Start: 2020-06-02 | End: 2020-08-04 | Stop reason: SDUPTHER

## 2020-08-04 ENCOUNTER — OFFICE VISIT (OUTPATIENT)
Dept: INTERNAL MEDICINE CLINIC | Age: 70
End: 2020-08-04
Payer: MEDICARE

## 2020-08-04 VITALS
TEMPERATURE: 98.2 F | SYSTOLIC BLOOD PRESSURE: 136 MMHG | OXYGEN SATURATION: 96 % | HEIGHT: 64 IN | BODY MASS INDEX: 31.24 KG/M2 | WEIGHT: 183 LBS | HEART RATE: 86 BPM | DIASTOLIC BLOOD PRESSURE: 84 MMHG

## 2020-08-04 DIAGNOSIS — M25.561 CHRONIC PAIN OF RIGHT KNEE: ICD-10-CM

## 2020-08-04 DIAGNOSIS — G89.29 CHRONIC PAIN OF RIGHT KNEE: ICD-10-CM

## 2020-08-04 DIAGNOSIS — M81.0 AGE-RELATED OSTEOPOROSIS WITHOUT CURRENT PATHOLOGICAL FRACTURE: ICD-10-CM

## 2020-08-04 DIAGNOSIS — I10 BENIGN ESSENTIAL HYPERTENSION: ICD-10-CM

## 2020-08-04 DIAGNOSIS — E78.5 HYPERLIPIDEMIA, UNSPECIFIED HYPERLIPIDEMIA TYPE: Primary | ICD-10-CM

## 2020-08-04 PROCEDURE — 3008F BODY MASS INDEX DOCD: CPT | Performed by: INTERNAL MEDICINE

## 2020-08-04 PROCEDURE — 1036F TOBACCO NON-USER: CPT | Performed by: INTERNAL MEDICINE

## 2020-08-04 PROCEDURE — 4040F PNEUMOC VAC/ADMIN/RCVD: CPT | Performed by: INTERNAL MEDICINE

## 2020-08-04 PROCEDURE — 3075F SYST BP GE 130 - 139MM HG: CPT | Performed by: INTERNAL MEDICINE

## 2020-08-04 PROCEDURE — 3079F DIAST BP 80-89 MM HG: CPT | Performed by: INTERNAL MEDICINE

## 2020-08-04 PROCEDURE — 99214 OFFICE O/P EST MOD 30 MIN: CPT | Performed by: INTERNAL MEDICINE

## 2020-08-04 PROCEDURE — 1160F RVW MEDS BY RX/DR IN RCRD: CPT | Performed by: INTERNAL MEDICINE

## 2020-08-04 RX ORDER — ALENDRONATE SODIUM 35 MG/1
35 TABLET ORAL
Qty: 12 TABLET | Refills: 1 | Status: SHIPPED | OUTPATIENT
Start: 2020-08-04 | End: 2021-03-11 | Stop reason: SDUPTHER

## 2020-08-04 RX ORDER — HYDROCHLOROTHIAZIDE 12.5 MG/1
12.5 TABLET ORAL DAILY
Qty: 90 TABLET | Refills: 1 | Status: SHIPPED | OUTPATIENT
Start: 2020-08-04 | End: 2021-03-11 | Stop reason: SDUPTHER

## 2020-08-04 NOTE — PROGRESS NOTES
Assessment/Plan:     Diagnoses and all orders for this visit:    Hyperlipidemia, unspecified hyperlipidemia type  -     Lipid panel; Future  -     TSH, 3rd generation with Free T4 reflex; Future    Age-related osteoporosis without current pathological fracture  -     alendronate (FOSAMAX) 35 mg tablet; Take 1 tablet (35 mg total) by mouth every 7 days    Benign essential hypertension  -     hydrochlorothiazide (HYDRODIURIL) 12 5 mg tablet; Take 1 tablet (12 5 mg total) by mouth daily  -     CBC and differential; Future  -     Comprehensive metabolic panel; Future  -     UA w Reflex to Microscopic w Reflex to Culture  -     TSH, 3rd generation with Free T4 reflex; Future    Chronic pain of right knee             Subjective:   Chief Complaint   Patient presents with    Follow-up     3 mo f/u Chronic Conditions        Patient ID: Jose Alfredo Fernandez is a 68 y o  female  HPI  [de-identified] years young lady who is here today for the regular follow-up lot of talk about  who has advanced Parkinson she is under the stress because of what is going on with him she does not have much of a symptoms she is doing well she is on medication for osteoporosis last DEXA scan was done on February 2, 2019 tension and hypercholesterolemia recent blood workup is not done the last blood workup was not done in December of 2019 all are well no side effects of the medications will order blood workup before her next visit  Blood pressure is decent and the weight is stable  The following portions of the patient's history were reviewed and updated as appropriate: allergies, current medications, past family history, past medical history, past social history, past surgical history and problem list     Review of Systems   Constitutional: Negative for chills and fatigue  HENT: Negative for congestion, ear pain, hearing loss, postnasal drip, sinus pressure, sore throat and voice change      Eyes: Negative for pain, discharge and visual disturbance  Respiratory: Negative for cough, chest tightness and shortness of breath  Cardiovascular: Negative for chest pain, palpitations and leg swelling  Gastrointestinal: Negative for abdominal pain, blood in stool, diarrhea, nausea and rectal pain  Genitourinary: Negative for difficulty urinating, dysuria and urgency  Musculoskeletal: Positive for back pain  Negative for arthralgias and joint swelling  Knee pain    Skin: Negative for rash  Allergic/Immunologic: Negative for environmental allergies and food allergies  Neurological: Negative for dizziness, tremors, weakness, numbness and headaches  Hematological: Negative for adenopathy  Psychiatric/Behavioral: Negative for behavioral problems and hallucinations  The patient is nervous/anxious           Increase stress           Past Medical History:   Diagnosis Date    Acid reflux     Allergic 1989    hay fever    Anxiety 2015    None now    Colon polyps     Hypertension     Migraine     Osteoporosis     Shingles 2011    had shot    Vitamin D deficiency 10/14/2013         Current Outpatient Medications:     alendronate (FOSAMAX) 35 mg tablet, Take 1 tablet (35 mg total) by mouth every 7 days, Disp: 12 tablet, Rfl: 1    calcium carbonate (OS-NEISHA) 1250 (500 Ca) MG chewable tablet, Chew 1 tablet (1,250 mg total) daily, Disp: 90 tablet, Rfl: 1    hydrochlorothiazide (HYDRODIURIL) 12 5 mg tablet, Take 1 tablet (12 5 mg total) by mouth daily, Disp: 90 tablet, Rfl: 1    losartan (COZAAR) 100 MG tablet, Take 1 tablet (100 mg total) by mouth daily, Disp: 90 tablet, Rfl: 1    metroNIDAZOLE (METROCREAM) 0 75 % cream, Apply topically 2 (two) times a day, Disp: , Rfl:     pravastatin (PRAVACHOL) 20 mg tablet, Take 1 tablet (20 mg total) by mouth daily, Disp: 90 tablet, Rfl: 1    Allergies   Allergen Reactions    Silicon Other (See Comments)     The silicon removed her skin    Omeprazole Other (See Comments)    Other Sneezing Hayfever, red eyes       Social History   Past Surgical History:   Procedure Laterality Date    DILATION AND CURETTAGE OF UTERUS      EYE SURGERY  2016    cataract    FRACTURE SURGERY  1974  2010    thigh shoulder    OTHER SURGICAL HISTORY  03/14/2016    Open Fracture Reduction    SHOULDER SURGERY      TOTAL SHOULDER REPLACEMENT       Family History   Problem Relation Age of Onset    Atrial fibrillation Brother     Colon cancer Family     Coronary artery disease Family     Diabetes Family         Diabetes Mellitus    Hyperlipidemia Family     Hypertension Family     Peripheral vascular disease Family     Stroke Family         Stroke Syndrome    Heart disease Mother     Arthritis Mother     Stroke Father     Alcohol abuse Father     No Known Problems Sister     No Known Problems Daughter     Arthritis Maternal Grandmother     No Known Problems Maternal Grandfather     Colon cancer Paternal Grandmother [de-identified]    Arthritis Paternal Grandmother     Alcohol abuse Paternal Grandfather     Prostate cancer Paternal Uncle 61    No Known Problems Maternal Aunt     No Known Problems Maternal Aunt     No Known Problems Paternal Aunt     Alcohol abuse Brother     Drug abuse Brother     Asthma Brother        Objective:  /84 (BP Location: Left arm, Patient Position: Sitting, Cuff Size: Standard)   Pulse 86   Temp 98 2 °F (36 8 °C) (Temporal)   Ht 5' 4 37"   Wt 83 kg (183 lb)   SpO2 96%   BMI 31 05 kg/m²        Physical Exam   Constitutional: She is oriented to person, place, and time  She appears well-developed  HENT:   Right Ear: External ear normal    Eyes: Pupils are equal, round, and reactive to light  Conjunctivae are normal    Neck: Normal range of motion  No JVD present  No thyromegaly present  Cardiovascular: Normal rate, regular rhythm and normal heart sounds  Pulmonary/Chest: Breath sounds normal    Abdominal: Soft   Bowel sounds are normal    Musculoskeletal: Normal range of motion  Lymphadenopathy:     She has no cervical adenopathy  Neurological: She is alert and oriented to person, place, and time  She has normal reflexes  Skin: Skin is dry     Psychiatric: Her behavior is normal  Judgment and thought content normal

## 2021-01-08 DIAGNOSIS — E78.5 HYPERLIPIDEMIA, UNSPECIFIED HYPERLIPIDEMIA TYPE: ICD-10-CM

## 2021-01-11 RX ORDER — PRAVASTATIN SODIUM 20 MG
TABLET ORAL
Qty: 90 TABLET | Refills: 0 | Status: SHIPPED | OUTPATIENT
Start: 2021-01-11 | End: 2021-03-11 | Stop reason: SDUPTHER

## 2021-02-17 DIAGNOSIS — I10 BENIGN ESSENTIAL HYPERTENSION: ICD-10-CM

## 2021-02-19 RX ORDER — HYDROCHLOROTHIAZIDE 12.5 MG/1
TABLET ORAL
Qty: 90 TABLET | Refills: 0 | OUTPATIENT
Start: 2021-02-19

## 2021-03-02 DIAGNOSIS — Z23 ENCOUNTER FOR IMMUNIZATION: ICD-10-CM

## 2021-03-03 ENCOUNTER — LAB (OUTPATIENT)
Dept: LAB | Age: 71
End: 2021-03-03
Payer: MEDICARE

## 2021-03-03 DIAGNOSIS — I10 BENIGN ESSENTIAL HYPERTENSION: ICD-10-CM

## 2021-03-03 DIAGNOSIS — E78.5 HYPERLIPIDEMIA, UNSPECIFIED HYPERLIPIDEMIA TYPE: ICD-10-CM

## 2021-03-03 LAB
BACTERIA UR QL AUTO: NORMAL /HPF
BASOPHILS # BLD AUTO: 0.09 THOUSANDS/ΜL (ref 0–0.1)
BASOPHILS NFR BLD AUTO: 1 % (ref 0–1)
BILIRUB UR QL STRIP: NEGATIVE
CLARITY UR: CLEAR
COLOR UR: YELLOW
EOSINOPHIL # BLD AUTO: 0.08 THOUSAND/ΜL (ref 0–0.61)
EOSINOPHIL NFR BLD AUTO: 1 % (ref 0–6)
ERYTHROCYTE [DISTWIDTH] IN BLOOD BY AUTOMATED COUNT: 11.9 % (ref 11.6–15.1)
GLUCOSE UR STRIP-MCNC: NEGATIVE MG/DL
HCT VFR BLD AUTO: 46.7 % (ref 34.8–46.1)
HGB BLD-MCNC: 15 G/DL (ref 11.5–15.4)
HGB UR QL STRIP.AUTO: NEGATIVE
HYALINE CASTS #/AREA URNS LPF: NORMAL /LPF
IMM GRANULOCYTES # BLD AUTO: 0.01 THOUSAND/UL (ref 0–0.2)
IMM GRANULOCYTES NFR BLD AUTO: 0 % (ref 0–2)
KETONES UR STRIP-MCNC: NEGATIVE MG/DL
LEUKOCYTE ESTERASE UR QL STRIP: ABNORMAL
LYMPHOCYTES # BLD AUTO: 2.26 THOUSANDS/ΜL (ref 0.6–4.47)
LYMPHOCYTES NFR BLD AUTO: 33 % (ref 14–44)
MCH RBC QN AUTO: 30.6 PG (ref 26.8–34.3)
MCHC RBC AUTO-ENTMCNC: 32.1 G/DL (ref 31.4–37.4)
MCV RBC AUTO: 95 FL (ref 82–98)
MONOCYTES # BLD AUTO: 0.59 THOUSAND/ΜL (ref 0.17–1.22)
MONOCYTES NFR BLD AUTO: 9 % (ref 4–12)
NEUTROPHILS # BLD AUTO: 3.93 THOUSANDS/ΜL (ref 1.85–7.62)
NEUTS SEG NFR BLD AUTO: 56 % (ref 43–75)
NITRITE UR QL STRIP: NEGATIVE
NON-SQ EPI CELLS URNS QL MICRO: NORMAL /HPF
NRBC BLD AUTO-RTO: 0 /100 WBCS
PH UR STRIP.AUTO: 6 [PH]
PLATELET # BLD AUTO: 309 THOUSANDS/UL (ref 149–390)
PMV BLD AUTO: 11.1 FL (ref 8.9–12.7)
PROT UR STRIP-MCNC: NEGATIVE MG/DL
RBC # BLD AUTO: 4.9 MILLION/UL (ref 3.81–5.12)
RBC #/AREA URNS AUTO: NORMAL /HPF
SP GR UR STRIP.AUTO: 1.02 (ref 1–1.03)
TSH SERPL DL<=0.05 MIU/L-ACNC: 1.7 UIU/ML (ref 0.36–3.74)
UROBILINOGEN UR QL STRIP.AUTO: 0.2 E.U./DL
WBC # BLD AUTO: 6.96 THOUSAND/UL (ref 4.31–10.16)
WBC #/AREA URNS AUTO: NORMAL /HPF

## 2021-03-03 PROCEDURE — 36415 COLL VENOUS BLD VENIPUNCTURE: CPT

## 2021-03-03 PROCEDURE — 85025 COMPLETE CBC W/AUTO DIFF WBC: CPT

## 2021-03-03 PROCEDURE — 81001 URINALYSIS AUTO W/SCOPE: CPT | Performed by: INTERNAL MEDICINE

## 2021-03-03 PROCEDURE — 84443 ASSAY THYROID STIM HORMONE: CPT

## 2021-03-05 ENCOUNTER — IMMUNIZATIONS (OUTPATIENT)
Dept: FAMILY MEDICINE CLINIC | Facility: HOSPITAL | Age: 71
End: 2021-03-05

## 2021-03-05 DIAGNOSIS — Z23 ENCOUNTER FOR IMMUNIZATION: Primary | ICD-10-CM

## 2021-03-05 PROCEDURE — 0001A SARS-COV-2 / COVID-19 MRNA VACCINE (PFIZER-BIONTECH) 30 MCG: CPT

## 2021-03-05 PROCEDURE — 91300 SARS-COV-2 / COVID-19 MRNA VACCINE (PFIZER-BIONTECH) 30 MCG: CPT

## 2021-03-11 ENCOUNTER — OFFICE VISIT (OUTPATIENT)
Dept: INTERNAL MEDICINE CLINIC | Age: 71
End: 2021-03-11
Payer: MEDICARE

## 2021-03-11 VITALS
OXYGEN SATURATION: 99 % | DIASTOLIC BLOOD PRESSURE: 76 MMHG | WEIGHT: 188 LBS | TEMPERATURE: 98.7 F | HEIGHT: 65 IN | SYSTOLIC BLOOD PRESSURE: 126 MMHG | HEART RATE: 75 BPM | BODY MASS INDEX: 31.32 KG/M2

## 2021-03-11 DIAGNOSIS — Z00.00 ENCOUNTER FOR ANNUAL WELLNESS VISIT (AWV) IN MEDICARE PATIENT: ICD-10-CM

## 2021-03-11 DIAGNOSIS — M81.0 AGE-RELATED OSTEOPOROSIS WITHOUT CURRENT PATHOLOGICAL FRACTURE: ICD-10-CM

## 2021-03-11 DIAGNOSIS — I10 BENIGN ESSENTIAL HYPERTENSION: Primary | ICD-10-CM

## 2021-03-11 DIAGNOSIS — E78.5 HYPERLIPIDEMIA, UNSPECIFIED HYPERLIPIDEMIA TYPE: ICD-10-CM

## 2021-03-11 PROCEDURE — 1123F ACP DISCUSS/DSCN MKR DOCD: CPT | Performed by: PHYSICIAN ASSISTANT

## 2021-03-11 PROCEDURE — G0439 PPPS, SUBSEQ VISIT: HCPCS | Performed by: PHYSICIAN ASSISTANT

## 2021-03-11 PROCEDURE — 99214 OFFICE O/P EST MOD 30 MIN: CPT | Performed by: PHYSICIAN ASSISTANT

## 2021-03-11 RX ORDER — ALENDRONATE SODIUM 35 MG/1
35 TABLET ORAL
Qty: 12 TABLET | Refills: 1 | Status: SHIPPED | OUTPATIENT
Start: 2021-03-11 | End: 2021-09-16 | Stop reason: SDUPTHER

## 2021-03-11 RX ORDER — HYDROCHLOROTHIAZIDE 12.5 MG/1
12.5 TABLET ORAL DAILY
Qty: 90 TABLET | Refills: 1 | Status: SHIPPED | OUTPATIENT
Start: 2021-03-11 | End: 2021-09-16 | Stop reason: SDUPTHER

## 2021-03-11 RX ORDER — PRAVASTATIN SODIUM 20 MG
20 TABLET ORAL DAILY
Qty: 90 TABLET | Refills: 1 | Status: SHIPPED | OUTPATIENT
Start: 2021-03-11 | End: 2021-09-16 | Stop reason: SDUPTHER

## 2021-03-11 RX ORDER — LOSARTAN POTASSIUM 100 MG/1
100 TABLET ORAL DAILY
Qty: 90 TABLET | Refills: 1 | Status: SHIPPED | OUTPATIENT
Start: 2021-03-11 | End: 2021-09-16 | Stop reason: SDUPTHER

## 2021-03-11 NOTE — PROGRESS NOTES
Assessment/Plan:         Diagnoses and all orders for this visit:    Benign essential hypertension  Comments:  well controlled, continue meds as outlined  Orders:  -     losartan (COZAAR) 100 MG tablet; Take 1 tablet (100 mg total) by mouth daily  -     hydrochlorothiazide (HYDRODIURIL) 12 5 mg tablet; Take 1 tablet (12 5 mg total) by mouth daily  -     CBC and differential; Future  -     Comprehensive metabolic panel; Future  -     Lipid panel; Future    Hyperlipidemia, unspecified hyperlipidemia type  Comments:  low chol diet   Orders:  -     pravastatin (PRAVACHOL) 20 mg tablet; Take 1 tablet (20 mg total) by mouth daily  -     CBC and differential; Future  -     Comprehensive metabolic panel; Future  -     Lipid panel; Future    Age-related osteoporosis without current pathological fracture  -     alendronate (FOSAMAX) 35 mg tablet; Take 1 tablet (35 mg total) by mouth every 7 days  -     CBC and differential; Future  -     Comprehensive metabolic panel; Future  -     Lipid panel; Future    Encounter for annual wellness visit (AWV) in Medicare patient        BMI Counseling: Body mass index is 31 77 kg/m²  The BMI is above normal  Nutrition recommendations include decreasing portion sizes, encouraging healthy choices of fruits and vegetables, decreasing fast food intake, consuming healthier snacks, limiting drinks that contain sugar and increasing intake of lean protein  Exercise recommendations include exercising 3-5 times per week and strength training exercises  Subjective:      Patient ID: Terence Flanagan is a 65 y o  female      64 y/o female follows up with hx of htn, osteoporosis, dyslipidemia   Pt reports her  passed in nov and she has been grieving although her stress level has improved as she no longer is caring for him  She reports she is sleeping better  BP is controlled  Labs reviewed with pt        The following portions of the patient's history were reviewed and updated as appropriate: allergies, current medications, past family history, past medical history, past social history, past surgical history and problem list        Review of Systems   Constitutional: Negative for appetite change, chills, fatigue and fever  HENT: Negative for congestion and sore throat  Respiratory: Negative for cough and shortness of breath  Cardiovascular: Negative for chest pain and palpitations  Gastrointestinal: Negative for abdominal pain, constipation, diarrhea, nausea and vomiting  Genitourinary: Negative for dysuria and frequency  Musculoskeletal: Negative for arthralgias and back pain  Skin: Negative for rash  Neurological: Negative for dizziness, light-headedness and headaches  Hematological: Does not bruise/bleed easily  Psychiatric/Behavioral: Negative for sleep disturbance  The patient is nervous/anxious (improved since  passed )            Past Medical History:   Diagnosis Date    Acid reflux     Allergic 1989    hay fever    Anxiety 2015    None now    Colon polyps     Hypertension     Migraine     Osteoporosis     Shingles 2011    had shot    Vitamin D deficiency 10/14/2013         Current Outpatient Medications:     alendronate (FOSAMAX) 35 mg tablet, Take 1 tablet (35 mg total) by mouth every 7 days, Disp: 12 tablet, Rfl: 1    calcium carbonate (OS-NEISHA) 1250 (500 Ca) MG chewable tablet, Chew 1 tablet (1,250 mg total) daily, Disp: 90 tablet, Rfl: 1    hydrochlorothiazide (HYDRODIURIL) 12 5 mg tablet, Take 1 tablet (12 5 mg total) by mouth daily, Disp: 90 tablet, Rfl: 1    losartan (COZAAR) 100 MG tablet, Take 1 tablet (100 mg total) by mouth daily, Disp: 90 tablet, Rfl: 1    pravastatin (PRAVACHOL) 20 mg tablet, Take 1 tablet (20 mg total) by mouth daily, Disp: 90 tablet, Rfl: 1    metroNIDAZOLE (METROCREAM) 0 75 % cream, Apply topically 2 (two) times a day, Disp: , Rfl:     Allergies   Allergen Reactions    Silicon Other (See Comments)     The silicon removed her skin    Omeprazole Other (See Comments)    Other Sneezing     Hayfever, red eyes       Social History   Past Surgical History:   Procedure Laterality Date    DILATION AND CURETTAGE OF UTERUS      EYE SURGERY  2016    cataract    FRACTURE SURGERY  1974  2010    thigh shoulder    OTHER SURGICAL HISTORY  03/14/2016    Open Fracture Reduction    SHOULDER SURGERY      TOTAL SHOULDER REPLACEMENT       Family History   Problem Relation Age of Onset    Atrial fibrillation Brother     Colon cancer Family     Coronary artery disease Family     Diabetes Family         Diabetes Mellitus    Hyperlipidemia Family     Hypertension Family     Peripheral vascular disease Family     Stroke Family         Stroke Syndrome    Heart disease Mother     Arthritis Mother     Stroke Father     Alcohol abuse Father     No Known Problems Sister     No Known Problems Daughter     Arthritis Maternal Grandmother     No Known Problems Maternal Grandfather     Colon cancer Paternal Grandmother [de-identified]    Arthritis Paternal Grandmother     Alcohol abuse Paternal Grandfather     Prostate cancer Paternal Uncle 61    No Known Problems Maternal Aunt     No Known Problems Maternal Aunt     No Known Problems Paternal Aunt     Alcohol abuse Brother     Drug abuse Brother     Asthma Brother        Objective:  /76 (BP Location: Right arm, Patient Position: Sitting, Cuff Size: Standard)   Pulse 75   Temp 98 7 °F (37 1 °C) (Temporal)   Ht 5' 4 5" (1 638 m) Comment: shoes on  Wt 85 3 kg (188 lb) Comment: shoes on  SpO2 99%   BMI 31 77 kg/m²        Physical Exam  Vitals signs reviewed  Constitutional:       General: She is not in acute distress  HENT:      Head: Normocephalic and atraumatic  Right Ear: Tympanic membrane, ear canal and external ear normal       Left Ear: Tympanic membrane, ear canal and external ear normal    Eyes:      General:         Right eye: No discharge           Left eye: No discharge  Extraocular Movements: Extraocular movements intact  Conjunctiva/sclera: Conjunctivae normal       Pupils: Pupils are equal, round, and reactive to light  Cardiovascular:      Rate and Rhythm: Normal rate and regular rhythm  Pulmonary:      Effort: Pulmonary effort is normal       Breath sounds: Normal breath sounds  No wheezing, rhonchi or rales  Abdominal:      General: Bowel sounds are normal    Musculoskeletal: Normal range of motion  General: No tenderness  Right lower leg: No edema  Left lower leg: No edema  Skin:     General: Skin is warm  Findings: No rash  Neurological:      General: No focal deficit present  Mental Status: She is alert and oriented to person, place, and time     Psychiatric:         Mood and Affect: Mood normal

## 2021-03-11 NOTE — PATIENT INSTRUCTIONS
Medicare Preventive Visit Patient Instructions  Thank you for completing your Welcome to Medicare Visit or Medicare Annual Wellness Visit today  Your next wellness visit will be due in one year (3/12/2022)  The screening/preventive services that you may require over the next 5-10 years are detailed below  Some tests may not apply to you based off risk factors and/or age  Screening tests ordered at today's visit but not completed yet may show as past due  Also, please note that scanned in results may not display below  Preventive Screenings:  Service Recommendations Previous Testing/Comments   Colorectal Cancer Screening  * Colonoscopy    * Fecal Occult Blood Test (FOBT)/Fecal Immunochemical Test (FIT)  * Fecal DNA/Cologuard Test  * Flexible Sigmoidoscopy Age: 54-65 years old   Colonoscopy: every 10 years (may be performed more frequently if at higher risk)  OR  FOBT/FIT: every 1 year  OR  Cologuard: every 3 years  OR  Sigmoidoscopy: every 5 years  Screening may be recommended earlier than age 48 if at higher risk for colorectal cancer  Also, an individualized decision between you and your healthcare provider will decide whether screening between the ages of 74-80 would be appropriate  Colonoscopy: 04/24/2013  FOBT/FIT: Not on file  Cologuard: Not on file  Sigmoidoscopy: Not on file    Screening Current     Breast Cancer Screening Age: 36 years old  Frequency: every 1-2 years  Not required if history of left and right mastectomy Mammogram: 05/22/2020    Screening Current   Cervical Cancer Screening Between the ages of 21-29, pap smear recommended once every 3 years  Between the ages of 33-67, can perform pap smear with HPV co-testing every 5 years     Recommendations may differ for women with a history of total hysterectomy, cervical cancer, or abnormal pap smears in past  Pap Smear: Not on file    Screening Not Indicated   Hepatitis C Screening Once for adults born between 1945 and 1965  More frequently in patients at high risk for Hepatitis C Hep C Antibody: 12/05/2019    Screening Current   Diabetes Screening 1-2 times per year if you're at risk for diabetes or have pre-diabetes Fasting glucose: 98 mg/dL   A1C: 5 7 %        Cholesterol Screening Once every 5 years if you don't have a lipid disorder  May order more often based on risk factors  Lipid panel: 11/22/2019    Screening Not Indicated  History Lipid Disorder     Other Preventive Screenings Covered by Medicare:  1  Abdominal Aortic Aneurysm (AAA) Screening: covered once if your at risk  You're considered to be at risk if you have a family history of AAA  2  Lung Cancer Screening: covers low dose CT scan once per year if you meet all of the following conditions: (1) Age 50-69; (2) No signs or symptoms of lung cancer; (3) Current smoker or have quit smoking within the last 15 years; (4) You have a tobacco smoking history of at least 30 pack years (packs per day multiplied by number of years you smoked); (5) You get a written order from a healthcare provider  3  Glaucoma Screening: covered annually if you're considered high risk: (1) You have diabetes OR (2) Family history of glaucoma OR (3)  aged 48 and older OR (3)  American aged 72 and older  3  Osteoporosis Screening: covered every 2 years if you meet one of the following conditions: (1) You're estrogen deficient and at risk for osteoporosis based off medical history and other findings; (2) Have a vertebral abnormality; (3) On glucocorticoid therapy for more than 3 months; (4) Have primary hyperparathyroidism; (5) On osteoporosis medications and need to assess response to drug therapy  · Last bone density test (DXA Scan): 02/02/2019   5  HIV Screening: covered annually if you're between the age of 15-65  Also covered annually if you are younger than 13 and older than 72 with risk factors for HIV infection   For pregnant patients, it is covered up to 3 times per pregnancy  Immunizations:  Immunization Recommendations   Influenza Vaccine Annual influenza vaccination during flu season is recommended for all persons aged >= 6 months who do not have contraindications   Pneumococcal Vaccine (Prevnar and Pneumovax)  * Prevnar = PCV13  * Pneumovax = PPSV23   Adults 25-60 years old: 1-3 doses may be recommended based on certain risk factors  Adults 72 years old: Prevnar (PCV13) vaccine recommended followed by Pneumovax (PPSV23) vaccine  If already received PPSV23 since turning 65, then PCV13 recommended at least one year after PPSV23 dose  Hepatitis B Vaccine 3 dose series if at intermediate or high risk (ex: diabetes, end stage renal disease, liver disease)   Tetanus (Td) Vaccine - COST NOT COVERED BY MEDICARE PART B Following completion of primary series, a booster dose should be given every 10 years to maintain immunity against tetanus  Td may also be given as tetanus wound prophylaxis  Tdap Vaccine - COST NOT COVERED BY MEDICARE PART B Recommended at least once for all adults  For pregnant patients, recommended with each pregnancy  Shingles Vaccine (Shingrix) - COST NOT COVERED BY MEDICARE PART B  2 shot series recommended in those aged 48 and above     Health Maintenance Due:      Topic Date Due    MAMMOGRAM  05/22/2022    Colorectal Cancer Screening  04/24/2023    Hepatitis C Screening  Completed     Immunizations Due:      Topic Date Due    DTaP,Tdap,and Td Vaccines (2 - Td) 02/04/2020     Advance Directives   What are advance directives? Advance directives are legal documents that state your wishes and plans for medical care  These plans are made ahead of time in case you lose your ability to make decisions for yourself  Advance directives can apply to any medical decision, such as the treatments you want, and if you want to donate organs  What are the types of advance directives?   There are many types of advance directives, and each state has rules about how to use them  You may choose a combination of any of the following:  · Living will: This is a written record of the treatment you want  You can also choose which treatments you do not want, which to limit, and which to stop at a certain time  This includes surgery, medicine, IV fluid, and tube feedings  · Durable power of  for healthcare [facility]): This is a written record that states who you want to make healthcare choices for you when you are unable to make them for yourself  This person, called a proxy, is usually a family member or a friend  You may choose more than 1 proxy  · Do not resuscitate (DNR) order:  A DNR order is used in case your heart stops beating or you stop breathing  It is a request not to have certain forms of treatment, such as CPR  A DNR order may be included in other types of advance directives  · Medical directive: This covers the care that you want if you are in a coma, near death, or unable to make decisions for yourself  You can list the treatments you want for each condition  Treatment may include pain medicine, surgery, blood transfusions, dialysis, IV or tube feedings, and a ventilator (breathing machine)  · Values history: This document has questions about your views, beliefs, and how you feel and think about life  This information can help others choose the care that you would choose  Why are advance directives important? An advance directive helps you control your care  Although spoken wishes may be used, it is better to have your wishes written down  Spoken wishes can be misunderstood, or not followed  Treatments may be given even if you do not want them  An advance directive may make it easier for your family to make difficult choices about your care  Urinary Incontinence   Urinary incontinence (UI)  is when you lose control of your bladder  UI develops because your bladder cannot store or empty urine properly   The 3 most common types of UI are stress incontinence, urge incontinence, or both  Medicines:   · May be given to help strengthen your bladder control  Report any side effects of medication to your healthcare provider  Do pelvic muscle exercises often:  Your pelvic muscles help you stop urinating  Squeeze these muscles tight for 5 seconds, then relax for 5 seconds  Gradually work up to squeezing for 10 seconds  Do 3 sets of 15 repetitions a day, or as directed  This will help strengthen your pelvic muscles and improve bladder control  Train your bladder:  Go to the bathroom at set times, such as every 2 hours, even if you do not feel the urge to go  You can also try to hold your urine when you feel the urge to go  For example, hold your urine for 5 minutes when you feel the urge to go  As that becomes easier, hold your urine for 10 minutes  Self-care:   · Keep a UI record  Write down how often you leak urine and how much you leak  Make a note of what you were doing when you leaked urine  · Drink liquids as directed  You may need to limit the amount of liquid you drink to help control your urine leakage  Do not drink any liquid right before you go to bed  Limit or do not have drinks that contain caffeine or alcohol  · Prevent constipation  Eat a variety of high-fiber foods  Good examples are high-fiber cereals, beans, vegetables, and whole-grain breads  Walking is the best way to trigger your intestines to have a bowel movement  · Exercise regularly and maintain a healthy weight  Weight loss and exercise will decrease pressure on your bladder and help you control your leakage  · Use a catheter as directed  to help empty your bladder  A catheter is a tiny, plastic tube that is put into your bladder to drain your urine  · Go to behavior therapy as directed  Behavior therapy may be used to help you learn to control your urge to urinate      Weight Management   Why it is important to manage your weight:  Being overweight increases your risk of health conditions such as heart disease, high blood pressure, type 2 diabetes, and certain types of cancer  It can also increase your risk for osteoarthritis, sleep apnea, and other respiratory problems  Aim for a slow, steady weight loss  Even a small amount of weight loss can lower your risk of health problems  How to lose weight safely:  A safe and healthy way to lose weight is to eat fewer calories and get regular exercise  You can lose up about 1 pound a week by decreasing the number of calories you eat by 500 calories each day  Healthy meal plan for weight management:  A healthy meal plan includes a variety of foods, contains fewer calories, and helps you stay healthy  A healthy meal plan includes the following:  · Eat whole-grain foods more often  A healthy meal plan should contain fiber  Fiber is the part of grains, fruits, and vegetables that is not broken down by your body  Whole-grain foods are healthy and provide extra fiber in your diet  Some examples of whole-grain foods are whole-wheat breads and pastas, oatmeal, brown rice, and bulgur  · Eat a variety of vegetables every day  Include dark, leafy greens such as spinach, kale, tristen greens, and mustard greens  Eat yellow and orange vegetables such as carrots, sweet potatoes, and winter squash  · Eat a variety of fruits every day  Choose fresh or canned fruit (canned in its own juice or light syrup) instead of juice  Fruit juice has very little or no fiber  · Eat low-fat dairy foods  Drink fat-free (skim) milk or 1% milk  Eat fat-free yogurt and low-fat cottage cheese  Try low-fat cheeses such as mozzarella and other reduced-fat cheeses  · Choose meat and other protein foods that are low in fat  Choose beans or other legumes such as split peas or lentils  Choose fish, skinless poultry (chicken or turkey), or lean cuts of red meat (beef or pork)  Before you cook meat or poultry, cut off any visible fat  · Use less fat and oil    Try baking foods instead of frying them  Add less fat, such as margarine, sour cream, regular salad dressing and mayonnaise to foods  Eat fewer high-fat foods  Some examples of high-fat foods include french fries, doughnuts, ice cream, and cakes  · Eat fewer sweets  Limit foods and drinks that are high in sugar  This includes candy, cookies, regular soda, and sweetened drinks  Exercise:  Exercise at least 30 minutes per day on most days of the week  Some examples of exercise include walking, biking, dancing, and swimming  You can also fit in more physical activity by taking the stairs instead of the elevator or parking farther away from stores  Ask your healthcare provider about the best exercise plan for you  © Copyright [employer] 2018 Information is for End User's use only and may not be sold, redistributed or otherwise used for commercial purposes   All illustrations and images included in CareNotes® are the copyrighted property of [employer] D [employer] M [employer]  or [address] [place] [address]

## 2021-03-11 NOTE — PROGRESS NOTES
Assessment and Plan:     Problem List Items Addressed This Visit        Cardiovascular and Mediastinum    Benign essential hypertension - Primary    Relevant Medications    losartan (COZAAR) 100 MG tablet    hydrochlorothiazide (HYDRODIURIL) 12 5 mg tablet       Musculoskeletal and Integument    Osteoporosis    Relevant Medications    alendronate (FOSAMAX) 35 mg tablet       Other    Hyperlipidemia    Relevant Medications    pravastatin (PRAVACHOL) 20 mg tablet      Other Visit Diagnoses     Encounter for annual wellness visit (AWV) in Medicare patient               Preventive health issues were discussed with patient, and age appropriate screening tests were ordered as noted in patient's After Visit Summary  Personalized health advice and appropriate referrals for health education or preventive services given if needed, as noted in patient's After Visit Summary       History of Present Illness:     Patient presents for Medicare Annual Wellness visit    Patient Care Team:  Hailey Hardy MD as PCP - General (Internal Medicine)     Problem List:     Patient Active Problem List   Diagnosis    Allergic rhinitis    Benign essential hypertension    Common migraine without aura    Esophageal reflux    Hyperlipidemia    Lipoma of neck    Osteoporosis    Lower back pain    Rosacea    Right knee pain    Imbalance    Vitamin D deficiency    Bilateral impacted cerumen    Lump of left wrist      Past Medical and Surgical History:     Past Medical History:   Diagnosis Date    Acid reflux     Allergic 1989    hay fever    Anxiety 2015    None now    Colon polyps     Hypertension     Migraine     Osteoporosis     Shingles 2011    had shot    Vitamin D deficiency 10/14/2013     Past Surgical History:   Procedure Laterality Date    DILATION AND CURETTAGE OF UTERUS      EYE SURGERY  2016    cataract    FRACTURE SURGERY  1974  2010    thigh shoulder    OTHER SURGICAL HISTORY  03/14/2016    Open Fracture Reduction    SHOULDER SURGERY      TOTAL SHOULDER REPLACEMENT        Family History:     Family History   Problem Relation Age of Onset    Atrial fibrillation Brother     Colon cancer Family     Coronary artery disease Family     Diabetes Family         Diabetes Mellitus    Hyperlipidemia Family     Hypertension Family     Peripheral vascular disease Family     Stroke Family         Stroke Syndrome    Heart disease Mother     Arthritis Mother     Stroke Father     Alcohol abuse Father     No Known Problems Sister     No Known Problems Daughter     Arthritis Maternal Grandmother     No Known Problems Maternal Grandfather     Colon cancer Paternal Grandmother [de-identified]    Arthritis Paternal Grandmother     Alcohol abuse Paternal Grandfather     Prostate cancer Paternal Uncle 61    No Known Problems Maternal Aunt     No Known Problems Maternal Aunt     No Known Problems Paternal Aunt     Alcohol abuse Brother     Drug abuse Brother     Asthma Brother       Social History:        Social History     Socioeconomic History    Marital status:       Spouse name: None    Number of children: None    Years of education: None    Highest education level: None   Occupational History    None   Social Needs    Financial resource strain: None    Food insecurity     Worry: None     Inability: None    Transportation needs     Medical: None     Non-medical: None   Tobacco Use    Smoking status: Former Smoker     Packs/day: 2 00     Years: 15 00     Pack years: 30 00     Types: Cigarettes     Quit date: 1991     Years since quittin 1    Smokeless tobacco: Never Used    Tobacco comment: Quite 25 yrs ago - Former heavy smoker; 2-3 ppd   Substance and Sexual Activity    Alcohol use: No    Drug use: Never    Sexual activity: Not Currently     Partners: Male     Birth control/protection: None   Lifestyle    Physical activity     Days per week: None     Minutes per session: None    Stress: None   Relationships    Social connections     Talks on phone: None     Gets together: None     Attends Confucianist service: None     Active member of club or organization: None     Attends meetings of clubs or organizations: None     Relationship status: None    Intimate partner violence     Fear of current or ex partner: None     Emotionally abused: None     Physically abused: None     Forced sexual activity: None   Other Topics Concern    None   Social History Narrative    None      Medications and Allergies:     Current Outpatient Medications   Medication Sig Dispense Refill    alendronate (FOSAMAX) 35 mg tablet Take 1 tablet (35 mg total) by mouth every 7 days 12 tablet 1    calcium carbonate (OS-NEISHA) 1250 (500 Ca) MG chewable tablet Chew 1 tablet (1,250 mg total) daily 90 tablet 1    hydrochlorothiazide (HYDRODIURIL) 12 5 mg tablet Take 1 tablet (12 5 mg total) by mouth daily 90 tablet 1    losartan (COZAAR) 100 MG tablet Take 1 tablet (100 mg total) by mouth daily 90 tablet 1    pravastatin (PRAVACHOL) 20 mg tablet Take 1 tablet (20 mg total) by mouth daily 90 tablet 1    metroNIDAZOLE (METROCREAM) 0 75 % cream Apply topically 2 (two) times a day       No current facility-administered medications for this visit        Allergies   Allergen Reactions    Silicon Other (See Comments)     The silicon removed her skin    Omeprazole Other (See Comments)    Other Sneezing     Hayfever, red eyes      Immunizations:     Immunization History   Administered Date(s) Administered    INFLUENZA 10/21/2015, 10/15/2018, 10/14/2019, 10/01/2020    Influenza Split High Dose Preservative Free IM 10/07/2017, 10/21/2018    Influenza, seasonal, injectable, preservative free 10/03/2016    Pneumococcal Conjugate 13-Valent 09/13/2018    Pneumococcal Polysaccharide PPV23 06/12/2014    SARS-CoV-2 / COVID-19 mRNA IM (Pfizer-BioNTech) 03/05/2021    Tdap 02/04/2010    Zoster 01/14/2010    Zoster Vaccine Recombinant 01/14/2010      Health Maintenance:         Topic Date Due    MAMMOGRAM  05/22/2022    Colorectal Cancer Screening  04/24/2023    Hepatitis C Screening  Completed         Topic Date Due    DTaP,Tdap,and Td Vaccines (2 - Td) 02/04/2020      Medicare Health Risk Assessment:     /76 (BP Location: Right arm, Patient Position: Sitting, Cuff Size: Standard)   Pulse 75   Temp 98 7 °F (37 1 °C) (Temporal)   Ht 5' 4 5" (1 638 m) Comment: shoes on  Wt 85 3 kg (188 lb) Comment: shoes on  SpO2 99%   BMI 31 77 kg/m²      Sonya is here for her Subsequent Wellness visit  Health Risk Assessment:   Patient rates overall health as excellent  Patient feels that their physical health rating is much better  Patient is very satisfied with their life  Eyesight was rated as same  Hearing was rated as same  Patient feels that their emotional and mental health rating is same  Patients states they are never, rarely angry  Patient states they are never, rarely unusually tired/fatigued  Pain experienced in the last 7 days has been some  Patient's pain rating has been 5/10  Patient states that she has experienced no weight loss or gain in last 6 months  Depression Screening:   PHQ-2 Score: 1      Fall Risk Screening: In the past year, patient has experienced: no history of falling in past year      Urinary Incontinence Screening:   Patient has leaked urine accidently in the last six months  Home Safety:  Patient has trouble with stairs inside or outside of their home  Patient has working smoke alarms and has working carbon monoxide detector  Home safety hazards include: none  Nutrition:   Current diet is Low Saturated Fat and No Added Salt  Medications:   Patient is currently taking over-the-counter supplements  OTC medications include: see medication list  Patient is able to manage medications       Activities of Daily Living (ADLs)/Instrumental Activities of Daily Living (IADLs):   Walk and transfer into and out of bed and chair?: Yes  Dress and groom yourself?: Yes    Bathe or shower yourself?: Yes    Feed yourself? Yes  Do your laundry/housekeeping?: Yes  Manage your money, pay your bills and track your expenses?: Yes  Make your own meals?: Yes    Do your own shopping?: Yes    Previous Hospitalizations:   Any hospitalizations or ED visits within the last 12 months?: No      Advance Care Planning:   Living will: Yes    Advanced directive: Yes      Cognitive Screening:   Provider or family/friend/caregiver concerned regarding cognition?: No    PREVENTIVE SCREENINGS      Cardiovascular Screening:    General: Screening Not Indicated and History Lipid Disorder      Diabetes Screening:     General: Screening Current      Colorectal Cancer Screening:     General: Screening Current      Breast Cancer Screening:     General: Screening Current      Cervical Cancer Screening:    General: Screening Not Indicated      Osteoporosis Screening:    General: Screening Not Indicated and History Osteoporosis      Abdominal Aortic Aneurysm (AAA) Screening:        General: Screening Current      Lung Cancer Screening:     General: Screening Not Indicated      Hepatitis C Screening:    General: Screening Current    Screening, Brief Intervention, and Referral to Treatment (SBIRT)    Screening  Typical number of drinks in a day: 0  Typical number of drinks in a week: 0  Interpretation: Low risk drinking behavior        Roselia Ortiz PA-C

## 2021-03-24 ENCOUNTER — IMMUNIZATIONS (OUTPATIENT)
Dept: FAMILY MEDICINE CLINIC | Facility: HOSPITAL | Age: 71
End: 2021-03-24

## 2021-03-24 DIAGNOSIS — Z23 ENCOUNTER FOR IMMUNIZATION: Primary | ICD-10-CM

## 2021-03-24 PROCEDURE — 91300 SARS-COV-2 / COVID-19 MRNA VACCINE (PFIZER-BIONTECH) 30 MCG: CPT

## 2021-03-24 PROCEDURE — 0002A SARS-COV-2 / COVID-19 MRNA VACCINE (PFIZER-BIONTECH) 30 MCG: CPT

## 2021-09-12 ENCOUNTER — APPOINTMENT (OUTPATIENT)
Dept: LAB | Age: 71
End: 2021-09-12
Payer: MEDICARE

## 2021-09-12 DIAGNOSIS — E78.5 HYPERLIPIDEMIA, UNSPECIFIED HYPERLIPIDEMIA TYPE: ICD-10-CM

## 2021-09-12 DIAGNOSIS — I10 BENIGN ESSENTIAL HYPERTENSION: ICD-10-CM

## 2021-09-12 DIAGNOSIS — M81.0 AGE-RELATED OSTEOPOROSIS WITHOUT CURRENT PATHOLOGICAL FRACTURE: ICD-10-CM

## 2021-09-12 LAB
ALBUMIN SERPL BCP-MCNC: 3.5 G/DL (ref 3.5–5)
ALP SERPL-CCNC: 102 U/L (ref 46–116)
ALT SERPL W P-5'-P-CCNC: 27 U/L (ref 12–78)
ANION GAP SERPL CALCULATED.3IONS-SCNC: 4 MMOL/L (ref 4–13)
AST SERPL W P-5'-P-CCNC: 14 U/L (ref 5–45)
BASOPHILS # BLD AUTO: 0.1 THOUSANDS/ΜL (ref 0–0.1)
BASOPHILS NFR BLD AUTO: 2 % (ref 0–1)
BILIRUB SERPL-MCNC: 0.64 MG/DL (ref 0.2–1)
BUN SERPL-MCNC: 18 MG/DL (ref 5–25)
CALCIUM SERPL-MCNC: 9.3 MG/DL (ref 8.3–10.1)
CHLORIDE SERPL-SCNC: 108 MMOL/L (ref 100–108)
CHOLEST SERPL-MCNC: 172 MG/DL (ref 50–200)
CO2 SERPL-SCNC: 30 MMOL/L (ref 21–32)
CREAT SERPL-MCNC: 0.72 MG/DL (ref 0.6–1.3)
EOSINOPHIL # BLD AUTO: 0.09 THOUSAND/ΜL (ref 0–0.61)
EOSINOPHIL NFR BLD AUTO: 1 % (ref 0–6)
ERYTHROCYTE [DISTWIDTH] IN BLOOD BY AUTOMATED COUNT: 11.9 % (ref 11.6–15.1)
GFR SERPL CREATININE-BSD FRML MDRD: 84 ML/MIN/1.73SQ M
GLUCOSE P FAST SERPL-MCNC: 99 MG/DL (ref 65–99)
HCT VFR BLD AUTO: 46.2 % (ref 34.8–46.1)
HDLC SERPL-MCNC: 52 MG/DL
HGB BLD-MCNC: 14.9 G/DL (ref 11.5–15.4)
IMM GRANULOCYTES # BLD AUTO: 0.01 THOUSAND/UL (ref 0–0.2)
IMM GRANULOCYTES NFR BLD AUTO: 0 % (ref 0–2)
LDLC SERPL CALC-MCNC: 85 MG/DL (ref 0–100)
LYMPHOCYTES # BLD AUTO: 2.32 THOUSANDS/ΜL (ref 0.6–4.47)
LYMPHOCYTES NFR BLD AUTO: 34 % (ref 14–44)
MCH RBC QN AUTO: 30.5 PG (ref 26.8–34.3)
MCHC RBC AUTO-ENTMCNC: 32.3 G/DL (ref 31.4–37.4)
MCV RBC AUTO: 95 FL (ref 82–98)
MONOCYTES # BLD AUTO: 0.56 THOUSAND/ΜL (ref 0.17–1.22)
MONOCYTES NFR BLD AUTO: 8 % (ref 4–12)
NEUTROPHILS # BLD AUTO: 3.66 THOUSANDS/ΜL (ref 1.85–7.62)
NEUTS SEG NFR BLD AUTO: 55 % (ref 43–75)
NONHDLC SERPL-MCNC: 120 MG/DL
NRBC BLD AUTO-RTO: 0 /100 WBCS
PLATELET # BLD AUTO: 297 THOUSANDS/UL (ref 149–390)
PMV BLD AUTO: 11.5 FL (ref 8.9–12.7)
POTASSIUM SERPL-SCNC: 3.8 MMOL/L (ref 3.5–5.3)
PROT SERPL-MCNC: 7.5 G/DL (ref 6.4–8.2)
RBC # BLD AUTO: 4.88 MILLION/UL (ref 3.81–5.12)
SODIUM SERPL-SCNC: 142 MMOL/L (ref 136–145)
TRIGL SERPL-MCNC: 176 MG/DL
WBC # BLD AUTO: 6.74 THOUSAND/UL (ref 4.31–10.16)

## 2021-09-12 PROCEDURE — 36415 COLL VENOUS BLD VENIPUNCTURE: CPT

## 2021-09-12 PROCEDURE — 85025 COMPLETE CBC W/AUTO DIFF WBC: CPT

## 2021-09-12 PROCEDURE — 80061 LIPID PANEL: CPT

## 2021-09-12 PROCEDURE — 80053 COMPREHEN METABOLIC PANEL: CPT

## 2021-09-16 ENCOUNTER — OFFICE VISIT (OUTPATIENT)
Dept: INTERNAL MEDICINE CLINIC | Age: 71
End: 2021-09-16
Payer: MEDICARE

## 2021-09-16 VITALS
DIASTOLIC BLOOD PRESSURE: 78 MMHG | HEIGHT: 65 IN | TEMPERATURE: 98.5 F | BODY MASS INDEX: 30.89 KG/M2 | OXYGEN SATURATION: 97 % | HEART RATE: 80 BPM | WEIGHT: 185.4 LBS | SYSTOLIC BLOOD PRESSURE: 128 MMHG

## 2021-09-16 DIAGNOSIS — E78.5 HYPERLIPIDEMIA, UNSPECIFIED HYPERLIPIDEMIA TYPE: Primary | ICD-10-CM

## 2021-09-16 DIAGNOSIS — Z12.31 BREAST CANCER SCREENING BY MAMMOGRAM: ICD-10-CM

## 2021-09-16 DIAGNOSIS — M81.0 AGE-RELATED OSTEOPOROSIS WITHOUT CURRENT PATHOLOGICAL FRACTURE: ICD-10-CM

## 2021-09-16 DIAGNOSIS — I10 BENIGN ESSENTIAL HYPERTENSION: ICD-10-CM

## 2021-09-16 DIAGNOSIS — Z23 NEED FOR INFLUENZA VACCINATION: ICD-10-CM

## 2021-09-16 PROCEDURE — 99214 OFFICE O/P EST MOD 30 MIN: CPT | Performed by: PHYSICIAN ASSISTANT

## 2021-09-16 PROCEDURE — G0008 ADMIN INFLUENZA VIRUS VAC: HCPCS

## 2021-09-16 PROCEDURE — 90662 IIV NO PRSV INCREASED AG IM: CPT

## 2021-09-16 RX ORDER — HYDROCHLOROTHIAZIDE 12.5 MG/1
12.5 TABLET ORAL DAILY
Qty: 90 TABLET | Refills: 1 | Status: SHIPPED | OUTPATIENT
Start: 2021-09-16 | End: 2022-03-16 | Stop reason: SDUPTHER

## 2021-09-16 RX ORDER — LOSARTAN POTASSIUM 100 MG/1
100 TABLET ORAL DAILY
Qty: 90 TABLET | Refills: 1 | Status: SHIPPED | OUTPATIENT
Start: 2021-09-16 | End: 2022-03-16 | Stop reason: SDUPTHER

## 2021-09-16 RX ORDER — PRAVASTATIN SODIUM 20 MG
20 TABLET ORAL DAILY
Qty: 90 TABLET | Refills: 1 | Status: SHIPPED | OUTPATIENT
Start: 2021-09-16 | End: 2022-05-23 | Stop reason: SDUPTHER

## 2021-09-16 RX ORDER — ALENDRONATE SODIUM 35 MG/1
35 TABLET ORAL
Qty: 12 TABLET | Refills: 1 | Status: SHIPPED | OUTPATIENT
Start: 2021-09-16 | End: 2022-03-16 | Stop reason: ALTCHOICE

## 2021-09-16 NOTE — PROGRESS NOTES
Assessment/Plan:         Diagnoses and all orders for this visit:    Hyperlipidemia, unspecified hyperlipidemia type  Comments:  low chol diet   continue pravastatin   Orders:  -     pravastatin (PRAVACHOL) 20 mg tablet; Take 1 tablet (20 mg total) by mouth daily  -     Lipid panel; Future  -     Comprehensive metabolic panel; Future  -     CBC and differential; Future    Benign essential hypertension  Comments:  well controlled, continue meds as outlined  Orders:  -     losartan (COZAAR) 100 MG tablet; Take 1 tablet (100 mg total) by mouth daily  -     hydrochlorothiazide (HYDRODIURIL) 12 5 mg tablet; Take 1 tablet (12 5 mg total) by mouth daily  -     Lipid panel; Future  -     Comprehensive metabolic panel; Future  -     CBC and differential; Future    Age-related osteoporosis without current pathological fracture  Comments:  continue alendronate   Orders:  -     alendronate (FOSAMAX) 35 mg tablet; Take 1 tablet (35 mg total) by mouth every 7 days  -     Lipid panel; Future  -     Comprehensive metabolic panel; Future  -     CBC and differential; Future    Breast cancer screening by mammogram  -     Mammo screening bilateral w 3d & cad; Future        Reviewed labs with pt, stable  F/u in 6 months with Dr Sly Fuchs   Subjective:      Patient ID: Hilario Lozada is a 65 y o  female  64 y/o female with hx of dyslipidemia, htn, osteoporosis  Pt reports she had increased anxiety today over son's medical condition and this is why her bp is elevated     Pt notes after her 2nd covid vaccine she had shaking chills   She is leary to have a 3rd dose for this reason           The following portions of the patient's history were reviewed and updated as appropriate: allergies, current medications, past family history, past medical history, past social history, past surgical history and problem list     Review of Systems   Constitutional: Negative for activity change, appetite change, chills, diaphoresis, fatigue and fever  HENT: Negative for congestion and sore throat  Eyes: Negative for pain and redness  Respiratory: Negative for cough and shortness of breath  Cardiovascular: Negative for chest pain and leg swelling  Gastrointestinal: Negative for abdominal pain, constipation, diarrhea and nausea  Musculoskeletal: Negative for arthralgias, back pain and gait problem  Skin: Negative for rash  Neurological: Negative for dizziness, light-headedness and headaches  Psychiatric/Behavioral: Negative for sleep disturbance  The patient is not nervous/anxious            Past Medical History:   Diagnosis Date    Acid reflux     Allergic 1989    hay fever    Anxiety 2015    None now    Colon polyps     Hypertension     Migraine     Osteoporosis     Shingles 2011    had shot    Vitamin D deficiency 10/14/2013         Current Outpatient Medications:     alendronate (FOSAMAX) 35 mg tablet, Take 1 tablet (35 mg total) by mouth every 7 days, Disp: 12 tablet, Rfl: 1    calcium carbonate (OS-NEISHA) 1250 (500 Ca) MG chewable tablet, Chew 1 tablet (1,250 mg total) daily, Disp: 90 tablet, Rfl: 1    hydrochlorothiazide (HYDRODIURIL) 12 5 mg tablet, Take 1 tablet (12 5 mg total) by mouth daily, Disp: 90 tablet, Rfl: 1    losartan (COZAAR) 100 MG tablet, Take 1 tablet (100 mg total) by mouth daily, Disp: 90 tablet, Rfl: 1    metroNIDAZOLE (METROCREAM) 0 75 % cream, Apply topically 2 (two) times a day, Disp: , Rfl:     pravastatin (PRAVACHOL) 20 mg tablet, Take 1 tablet (20 mg total) by mouth daily, Disp: 90 tablet, Rfl: 1    Allergies   Allergen Reactions    Silicon Other (See Comments)     The silicon removed her skin    Omeprazole Other (See Comments)    Other Sneezing     Hayfever, red eyes       Social History   Past Surgical History:   Procedure Laterality Date    DILATION AND CURETTAGE OF UTERUS      EYE SURGERY  2016    cataract   [address]  2010    thigh shoulder    OTHER SURGICAL HISTORY 03/14/2016    Open Fracture Reduction    SHOULDER SURGERY      TOTAL SHOULDER REPLACEMENT       Family History   Problem Relation Age of Onset    Atrial fibrillation Brother     Colon cancer Family     Coronary artery disease Family     Diabetes Family         Diabetes Mellitus    Hyperlipidemia Family     Hypertension Family     Peripheral vascular disease Family     Stroke Family         Stroke Syndrome    Heart disease Mother     Arthritis Mother     Stroke Father     Alcohol abuse Father     No Known Problems Sister     No Known Problems Daughter     Arthritis Maternal Grandmother     No Known Problems Maternal Grandfather     Colon cancer Paternal Grandmother [de-identified]    Arthritis Paternal Grandmother     Alcohol abuse Paternal Grandfather     Prostate cancer Paternal Uncle 61    No Known Problems Maternal Aunt     No Known Problems Maternal Aunt     No Known Problems Paternal Aunt     Alcohol abuse Brother     Drug abuse Brother     Asthma Brother        Objective:  /78 (BP Location: Left arm, Patient Position: Sitting, Cuff Size: Standard)   Pulse 80   Temp 98 5 °F (36 9 °C) (Temporal)   Ht 5' 4 5" (1 638 m)   Wt 84 1 kg (185 lb 6 4 oz)   SpO2 97%   BMI 31 33 kg/m²        Physical Exam  Vitals reviewed  Constitutional:       General: She is not in acute distress  HENT:      Head: Normocephalic and atraumatic  Right Ear: Tympanic membrane, ear canal and external ear normal       Left Ear: Tympanic membrane, ear canal and external ear normal    Eyes:      General:         Right eye: No discharge  Left eye: No discharge  Extraocular Movements: Extraocular movements intact  Conjunctiva/sclera: Conjunctivae normal       Pupils: Pupils are equal, round, and reactive to light  Cardiovascular:      Rate and Rhythm: Normal rate and regular rhythm  Pulmonary:      Effort: Pulmonary effort is normal  No respiratory distress        Breath sounds: Normal breath sounds  No wheezing, rhonchi or rales  Musculoskeletal:         General: Normal range of motion  Right lower leg: No edema  Left lower leg: No edema  Skin:     General: Skin is warm  Findings: No erythema or rash  Neurological:      General: No focal deficit present  Mental Status: She is alert and oriented to person, place, and time  Cranial Nerves: No cranial nerve deficit     Psychiatric:         Mood and Affect: Mood normal          Behavior: Behavior normal

## 2021-09-17 ENCOUNTER — HOSPITAL ENCOUNTER (OUTPATIENT)
Dept: RADIOLOGY | Age: 71
Discharge: HOME | End: 2021-09-17
Payer: MEDICARE

## 2021-09-17 VITALS — BODY MASS INDEX: 31.58 KG/M2 | WEIGHT: 185 LBS | HEIGHT: 64 IN

## 2021-09-17 DIAGNOSIS — Z12.31 BREAST CANCER SCREENING BY MAMMOGRAM: ICD-10-CM

## 2021-09-17 DIAGNOSIS — Z12.31 ENCOUNTER FOR SCREENING MAMMOGRAM FOR MALIGNANT NEOPLASM OF BREAST: ICD-10-CM

## 2021-09-17 PROCEDURE — 77067 SCR MAMMO BI INCL CAD: CPT

## 2021-09-17 PROCEDURE — 77063 BREAST TOMOSYNTHESIS BI: CPT

## 2022-01-12 ENCOUNTER — LAB REQUISITION (OUTPATIENT)
Dept: LAB | Facility: HOSPITAL | Age: 72
End: 2022-01-12
Payer: MEDICARE

## 2022-01-12 DIAGNOSIS — K31.89 OTHER DISEASES OF STOMACH AND DUODENUM: ICD-10-CM

## 2022-01-12 DIAGNOSIS — Z80.0 FAMILY HISTORY OF MALIGNANT NEOPLASM OF DIGESTIVE ORGANS: ICD-10-CM

## 2022-01-12 DIAGNOSIS — K57.30 DIVERTICULOSIS OF LARGE INTESTINE WITHOUT PERFORATION OR ABSCESS WITHOUT BLEEDING: ICD-10-CM

## 2022-01-12 DIAGNOSIS — K21.9 GASTRO-ESOPHAGEAL REFLUX DISEASE WITHOUT ESOPHAGITIS: ICD-10-CM

## 2022-01-12 DIAGNOSIS — K22.89 OTHER SPECIFIED DISEASE OF ESOPHAGUS: ICD-10-CM

## 2022-01-12 DIAGNOSIS — Z12.11 ENCOUNTER FOR SCREENING FOR MALIGNANT NEOPLASM OF COLON: ICD-10-CM

## 2022-01-12 DIAGNOSIS — R13.19 OTHER DYSPHAGIA: ICD-10-CM

## 2022-01-12 PROCEDURE — 88305 TISSUE EXAM BY PATHOLOGIST: CPT | Performed by: PATHOLOGY

## 2022-03-08 ENCOUNTER — RA CDI HCC (OUTPATIENT)
Dept: OTHER | Facility: HOSPITAL | Age: 72
End: 2022-03-08

## 2022-03-08 NOTE — PROGRESS NOTES
[facility] 73  coding opportunities       Chart reviewed, no opportunity found: CHART REVIEWED, NO OPPORTUNITY FOUND                        Patients insurance company: [employer]

## 2022-03-09 ENCOUNTER — APPOINTMENT (OUTPATIENT)
Dept: LAB | Age: 72
End: 2022-03-09
Payer: MEDICARE

## 2022-03-09 DIAGNOSIS — I10 BENIGN ESSENTIAL HYPERTENSION: ICD-10-CM

## 2022-03-09 DIAGNOSIS — M81.0 AGE-RELATED OSTEOPOROSIS WITHOUT CURRENT PATHOLOGICAL FRACTURE: ICD-10-CM

## 2022-03-09 DIAGNOSIS — E78.5 HYPERLIPIDEMIA, UNSPECIFIED HYPERLIPIDEMIA TYPE: ICD-10-CM

## 2022-03-09 LAB
ALBUMIN SERPL BCP-MCNC: 3.7 G/DL (ref 3.5–5)
ALP SERPL-CCNC: 84 U/L (ref 46–116)
ALT SERPL W P-5'-P-CCNC: 25 U/L (ref 12–78)
ANION GAP SERPL CALCULATED.3IONS-SCNC: 4 MMOL/L (ref 4–13)
AST SERPL W P-5'-P-CCNC: 15 U/L (ref 5–45)
BASOPHILS # BLD AUTO: 0.09 THOUSANDS/ΜL (ref 0–0.1)
BASOPHILS NFR BLD AUTO: 1 % (ref 0–1)
BILIRUB SERPL-MCNC: 0.99 MG/DL (ref 0.2–1)
BUN SERPL-MCNC: 20 MG/DL (ref 5–25)
CALCIUM SERPL-MCNC: 9.8 MG/DL (ref 8.3–10.1)
CHLORIDE SERPL-SCNC: 106 MMOL/L (ref 100–108)
CHOLEST SERPL-MCNC: 179 MG/DL
CO2 SERPL-SCNC: 29 MMOL/L (ref 21–32)
CREAT SERPL-MCNC: 0.8 MG/DL (ref 0.6–1.3)
EOSINOPHIL # BLD AUTO: 0.11 THOUSAND/ΜL (ref 0–0.61)
EOSINOPHIL NFR BLD AUTO: 1 % (ref 0–6)
ERYTHROCYTE [DISTWIDTH] IN BLOOD BY AUTOMATED COUNT: 12.2 % (ref 11.6–15.1)
GFR SERPL CREATININE-BSD FRML MDRD: 73 ML/MIN/1.73SQ M
GLUCOSE P FAST SERPL-MCNC: 99 MG/DL (ref 65–99)
HCT VFR BLD AUTO: 44.6 % (ref 34.8–46.1)
HDLC SERPL-MCNC: 60 MG/DL
HGB BLD-MCNC: 14.6 G/DL (ref 11.5–15.4)
IMM GRANULOCYTES # BLD AUTO: 0.03 THOUSAND/UL (ref 0–0.2)
IMM GRANULOCYTES NFR BLD AUTO: 0 % (ref 0–2)
LDLC SERPL CALC-MCNC: 96 MG/DL (ref 0–100)
LYMPHOCYTES # BLD AUTO: 2.01 THOUSANDS/ΜL (ref 0.6–4.47)
LYMPHOCYTES NFR BLD AUTO: 25 % (ref 14–44)
MCH RBC QN AUTO: 29.9 PG (ref 26.8–34.3)
MCHC RBC AUTO-ENTMCNC: 32.7 G/DL (ref 31.4–37.4)
MCV RBC AUTO: 91 FL (ref 82–98)
MONOCYTES # BLD AUTO: 0.68 THOUSAND/ΜL (ref 0.17–1.22)
MONOCYTES NFR BLD AUTO: 9 % (ref 4–12)
NEUTROPHILS # BLD AUTO: 5.12 THOUSANDS/ΜL (ref 1.85–7.62)
NEUTS SEG NFR BLD AUTO: 64 % (ref 43–75)
NONHDLC SERPL-MCNC: 119 MG/DL
NRBC BLD AUTO-RTO: 0 /100 WBCS
PLATELET # BLD AUTO: 321 THOUSANDS/UL (ref 149–390)
PMV BLD AUTO: 11.2 FL (ref 8.9–12.7)
POTASSIUM SERPL-SCNC: 4.1 MMOL/L (ref 3.5–5.3)
PROT SERPL-MCNC: 7.6 G/DL (ref 6.4–8.2)
RBC # BLD AUTO: 4.89 MILLION/UL (ref 3.81–5.12)
SODIUM SERPL-SCNC: 139 MMOL/L (ref 136–145)
TRIGL SERPL-MCNC: 116 MG/DL
WBC # BLD AUTO: 8.04 THOUSAND/UL (ref 4.31–10.16)

## 2022-03-09 PROCEDURE — 36415 COLL VENOUS BLD VENIPUNCTURE: CPT

## 2022-03-09 PROCEDURE — 85025 COMPLETE CBC W/AUTO DIFF WBC: CPT

## 2022-03-09 PROCEDURE — 80053 COMPREHEN METABOLIC PANEL: CPT

## 2022-03-09 PROCEDURE — 80061 LIPID PANEL: CPT

## 2022-03-16 ENCOUNTER — OFFICE VISIT (OUTPATIENT)
Dept: INTERNAL MEDICINE CLINIC | Age: 72
End: 2022-03-16
Payer: MEDICARE

## 2022-03-16 VITALS
DIASTOLIC BLOOD PRESSURE: 80 MMHG | TEMPERATURE: 98.5 F | OXYGEN SATURATION: 98 % | HEART RATE: 80 BPM | SYSTOLIC BLOOD PRESSURE: 142 MMHG | BODY MASS INDEX: 30.48 KG/M2 | WEIGHT: 178.5 LBS | HEIGHT: 64 IN

## 2022-03-16 DIAGNOSIS — M81.0 AGE-RELATED OSTEOPOROSIS WITHOUT CURRENT PATHOLOGICAL FRACTURE: ICD-10-CM

## 2022-03-16 DIAGNOSIS — G43.009 MIGRAINE WITHOUT AURA AND WITHOUT STATUS MIGRAINOSUS, NOT INTRACTABLE: ICD-10-CM

## 2022-03-16 DIAGNOSIS — I10 BENIGN ESSENTIAL HYPERTENSION: ICD-10-CM

## 2022-03-16 DIAGNOSIS — Z00.00 MEDICARE ANNUAL WELLNESS VISIT, SUBSEQUENT: ICD-10-CM

## 2022-03-16 DIAGNOSIS — M85.851 OSTEOPENIA OF BOTH HIPS: Primary | ICD-10-CM

## 2022-03-16 DIAGNOSIS — M85.852 OSTEOPENIA OF BOTH HIPS: Primary | ICD-10-CM

## 2022-03-16 PROBLEM — R22.32 LUMP OF LEFT WRIST: Status: RESOLVED | Noted: 2020-04-28 | Resolved: 2022-03-16

## 2022-03-16 PROBLEM — H61.23 BILATERAL IMPACTED CERUMEN: Status: RESOLVED | Noted: 2018-12-21 | Resolved: 2022-03-16

## 2022-03-16 PROCEDURE — 1123F ACP DISCUSS/DSCN MKR DOCD: CPT | Performed by: INTERNAL MEDICINE

## 2022-03-16 PROCEDURE — 99214 OFFICE O/P EST MOD 30 MIN: CPT | Performed by: INTERNAL MEDICINE

## 2022-03-16 PROCEDURE — G0439 PPPS, SUBSEQ VISIT: HCPCS | Performed by: INTERNAL MEDICINE

## 2022-03-16 RX ORDER — HYDROCHLOROTHIAZIDE 25 MG/1
25 TABLET ORAL DAILY
Qty: 90 TABLET | Refills: 3 | Status: SHIPPED | OUTPATIENT
Start: 2022-03-16

## 2022-03-16 RX ORDER — LOSARTAN POTASSIUM 100 MG/1
100 TABLET ORAL DAILY
Qty: 90 TABLET | Refills: 1 | Status: SHIPPED | OUTPATIENT
Start: 2022-03-16 | End: 2022-06-16 | Stop reason: SDUPTHER

## 2022-03-16 RX ORDER — HYDROCHLOROTHIAZIDE 12.5 MG/1
12.5 TABLET ORAL DAILY
Qty: 90 TABLET | Refills: 1 | Status: SHIPPED | OUTPATIENT
Start: 2022-03-16 | End: 2022-03-16 | Stop reason: SDUPTHER

## 2022-03-16 NOTE — PROGRESS NOTES
Assessment and Plan:     Problem List Items Addressed This Visit     None        BMI Counseling: Body mass index is 30 64 kg/m²  The BMI is above normal  Nutrition recommendations include decreasing portion sizes, encouraging healthy choices of fruits and vegetables and moderation in carbohydrate intake  Exercise recommendations include moderate physical activity 150 minutes/week  Rationale for BMI follow-up plan is due to patient being overweight or obese  Depression Screening and Follow-up Plan: Patient was screened for depression during today's encounter  They screened negative with a PHQ-2 score of 0  Preventive health issues were discussed with patient, and age appropriate screening tests were ordered as noted in patient's After Visit Summary  Personalized health advice and appropriate referrals for health education or preventive services given if needed, as noted in patient's After Visit Summary       History of Present Illness:     Patient presents for Medicare Annual Wellness visit    Patient Care Team:  Maribel Rivera MD as PCP - General (Internal Medicine)     Problem List:     Patient Active Problem List   Diagnosis    Allergic rhinitis    Benign essential hypertension    Common migraine without aura    Esophageal reflux    Hyperlipidemia    Lipoma of neck    Osteoporosis    Lower back pain    Rosacea    Right knee pain    Imbalance    Vitamin D deficiency    Bilateral impacted cerumen    Lump of left wrist      Past Medical and Surgical History:     Past Medical History:   Diagnosis Date    Acid reflux     Allergic 1989    hay fever    Anxiety 2015    None now    Colon polyps     Hypertension     Migraine     Osteoporosis     Shingles 2011    had shot    Vitamin D deficiency 10/14/2013     Past Surgical History:   Procedure Laterality Date    DILATION AND CURETTAGE OF UTERUS      EYE SURGERY  2016    cataract    FRACTURE SURGERY  1974  2010    thigh shoulder    OTHER SURGICAL HISTORY  2016    Open Fracture Reduction    SHOULDER SURGERY      TOTAL SHOULDER REPLACEMENT        Family History:     Family History   Problem Relation Age of Onset    Atrial fibrillation Brother     Colon cancer Family     Coronary artery disease Family     Diabetes Family         Diabetes Mellitus    Hyperlipidemia Family     Hypertension Family     Peripheral vascular disease Family     Stroke Family         Stroke Syndrome    Heart disease Mother     Arthritis Mother     Stroke Father     Alcohol abuse Father     No Known Problems Sister     No Known Problems Daughter     Arthritis Maternal Grandmother     No Known Problems Maternal Grandfather     Colon cancer Paternal Grandmother [de-identified]    Arthritis Paternal Grandmother     Alcohol abuse Paternal Grandfather     Prostate cancer Paternal Uncle 61    No Known Problems Maternal Aunt     No Known Problems Maternal Aunt     No Known Problems Paternal Aunt     Alcohol abuse Brother     Drug abuse Brother     Asthma Brother       Social History:     Social History     Socioeconomic History    Marital status:       Spouse name: Not on file    Number of children: Not on file    Years of education: Not on file    Highest education level: Not on file   Occupational History    Not on file   Tobacco Use    Smoking status: Former Smoker     Packs/day: 2 00     Years: 15 00     Pack years: 30 00     Types: Cigarettes     Quit date: 1991     Years since quittin 1    Smokeless tobacco: Never Used    Tobacco comment: Quite 25 yrs ago - Former heavy smoker; 2-3 ppd   Vaping Use    Vaping Use: Never used   Substance and Sexual Activity    Alcohol use: No    Drug use: Never    Sexual activity: Not Currently     Partners: Male     Birth control/protection: None   Other Topics Concern    Not on file   Social History Narrative    Not on file     Social Determinants of Health     Financial Resource Strain: Not on file   Food Insecurity: Not on file   Transportation Needs: Not on file   Physical Activity: Not on file   Stress: Not on file   Social Connections: Not on file   Intimate Partner Violence: Not on file   Housing Stability: Not on file      Medications and Allergies:     Current Outpatient Medications   Medication Sig Dispense Refill    alendronate (FOSAMAX) 35 mg tablet Take 1 tablet (35 mg total) by mouth every 7 days 12 tablet 1    calcium carbonate (OS-NEISHA) 1250 (500 Ca) MG chewable tablet Chew 1 tablet (1,250 mg total) daily 90 tablet 1    hydrochlorothiazide (HYDRODIURIL) 12 5 mg tablet Take 1 tablet (12 5 mg total) by mouth daily 90 tablet 1    losartan (COZAAR) 100 MG tablet Take 1 tablet (100 mg total) by mouth daily 90 tablet 1    metroNIDAZOLE (METROCREAM) 0 75 % cream Apply topically 2 (two) times a day      pravastatin (PRAVACHOL) 20 mg tablet Take 1 tablet (20 mg total) by mouth daily 90 tablet 1     No current facility-administered medications for this visit       Allergies   Allergen Reactions    Silicon Other (See Comments)     The silicon removed her skin    Omeprazole Other (See Comments)    Other Sneezing     Hayfever, red eyes      Immunizations:     Immunization History   Administered Date(s) Administered    COVID-19 PFIZER VACCINE 0 3 ML IM 03/05/2021, 03/24/2021    INFLUENZA 10/21/2015, 10/15/2018, 10/14/2019, 10/01/2020    Influenza Split High Dose Preservative Free IM 10/07/2017, 10/21/2018    Influenza, high dose seasonal 0 7 mL 09/16/2021    Influenza, seasonal, injectable, preservative free 10/03/2016    Pneumococcal Conjugate 13-Valent 09/13/2018    Pneumococcal Polysaccharide PPV23 06/12/2014    Tdap 02/04/2010    Zoster 01/14/2010    Zoster Vaccine Recombinant 01/14/2010      Health Maintenance:         Topic Date Due    Breast Cancer Screening: Mammogram  09/17/2022    Colorectal Cancer Screening  04/24/2023    Hepatitis C Screening  Completed         Topic Date Due    DTaP,Tdap,and Td Vaccines (2 - Td or Tdap) 02/04/2020    COVID-19 Vaccine (3 - Booster for Pfizer series) 08/24/2021      Medicare Health Risk Assessment:     There were no vitals taken for this visit  Sonya is here for her Subsequent Wellness visit  Last Medicare Wellness visit information reviewed, patient interviewed and updates made to the record today  Health Risk Assessment:   Patient rates overall health as excellent  Patient feels that their physical health rating is much better  Patient is very satisfied with their life  Eyesight was rated as same  Hearing was rated as same  Patient feels that their emotional and mental health rating is much better  Patients states they are never, rarely angry  Patient states they are never, rarely unusually tired/fatigued  Pain experienced in the last 7 days has been none  Patient states that she has experienced no weight loss or gain in last 6 months  Depression Screening:   PHQ-2 Score: 0      Fall Risk Screening: In the past year, patient has experienced: no history of falling in past year      Urinary Incontinence Screening:   Patient has not leaked urine accidently in the last six months  Home Safety:  Patient does not have trouble with stairs inside or outside of their home  Patient has working smoke alarms and has working carbon monoxide detector  Home safety hazards include: none  Nutrition:   Current diet is Regular  Medications:   Patient is currently taking over-the-counter supplements  OTC medications include: calcium and chondroitin  Patient is able to manage medications  Activities of Daily Living (ADLs)/Instrumental Activities of Daily Living (IADLs):   Walk and transfer into and out of bed and chair?: Yes  Dress and groom yourself?: Yes    Bathe or shower yourself?: Yes    Feed yourself?  Yes  Do your laundry/housekeeping?: Yes  Manage your money, pay your bills and track your expenses?: Yes  Make your own meals?: Yes    Do your own shopping?: Yes    Previous Hospitalizations:   Any hospitalizations or ED visits within the last 12 months?: No      Advance Care Planning:   Living will: Yes    Durable POA for healthcare: Yes    Advanced directive: Yes    Advanced directive counseling given: Yes      Cognitive Screening:   Provider or family/friend/caregiver concerned regarding cognition?: No    PREVENTIVE SCREENINGS      Cardiovascular Screening:    General: Screening Not Indicated and History Lipid Disorder      Diabetes Screening:     General: Screening Current      Colorectal Cancer Screening:     General: Screening Current      Breast Cancer Screening:     General: Screening Current      Cervical Cancer Screening:    General: Screening Not Indicated      Osteoporosis Screening:    General: Screening Not Indicated and History Osteoporosis      Abdominal Aortic Aneurysm (AAA) Screening:        General: Screening Current      Lung Cancer Screening:     General: Screening Not Indicated      Hepatitis C Screening:    General: Screening Current    Screening, Brief Intervention, and Referral to Treatment (SBIRT)    Screening  Typical number of drinks in a day: 0  Typical number of drinks in a week: 0  Interpretation: Low risk drinking behavior  AUDIT-C Screenin) How often did you have a drink containing alcohol in the past year? monthly or less  2) How many drinks did you have on a typical day when you were drinking in the past year?  1 to 2  3) How often did you have 6 or more drinks on one occasion in the past year? never    AUDIT-C Score: 1  Interpretation: Score 0-2 (female): Negative screen for alcohol misuse    Single Item Drug Screening:  How often have you used an illegal drug (including marijuana) or a prescription medication for non-medical reasons in the past year? never    Single Item Drug Screen Score: 0  Interpretation: Negative screen for possible drug use disorder    Other Counseling Topics:   Calcium and vitamin D intake and regular weightbearing exercise         Nichole Restrepo MD

## 2022-03-16 NOTE — PROGRESS NOTES
Assessment/Plan:     Diagnoses and all orders for this visit:    Osteopenia of both hips  -     DXA bone density spine hip and pelvis; Future    Benign essential hypertension  Comments:  well controlled, continue meds as outlined  Orders:  -     Discontinue: hydrochlorothiazide (HYDRODIURIL) 12 5 mg tablet; Take 1 tablet (12 5 mg total) by mouth daily  -     losartan (COZAAR) 100 MG tablet; Take 1 tablet (100 mg total) by mouth daily  -     hydrochlorothiazide (HYDRODIURIL) 25 mg tablet; Take 1 tablet (25 mg total) by mouth daily  -     Basic metabolic panel; Future    Migraine without aura and without status migrainosus, not intractable  No more migraines  Age-related osteoporosis without current pathological fracture    follow-up with the DEXA scan         M*Modal software was used to dictate this note  It may contain errors with dictating incorrect words or incorrect spelling  Please contact the provider directly with any questions  Subjective:   Chief Complaint   Patient presents with    Follow-up     hyperlipidemia    Review labs     Medicare Wellness Visit     SUB AWV     Answers for HPI/ROS submitted by the patient on 3/14/2022  How would you rate your overall health?: excellent  Compared to last year, how is your physical health?: much better  In general, how satisfied are you with your life?: very satisfied  Compared to last year, how is your eyesight?: same  Compared to last year, how is your hearing?: same  Compared to last year, how is your emotional/mental health?: much better  How often is anger a problem for you?: never, rarely  How often do you feel unusually tired/fatigued?: never, rarely  In the past 7 days, how much pain have you experienced?: none  In the past 6 months, have you lost or gained 10 pounds without trying?: No  One or more falls in the last year: No  In the past 6 months, have you accidentally leaked urine?: No  Do you have trouble with the stairs inside or outside your home?: No  Does your home have working smoke alarms?: Yes  Does your home have a carbon monoxide monitor?: Yes  Which safety hazards (if any) have you experienced in your home? Please select all that apply : none  How would you describe your current diet? Please select all that apply : Regular  In addition to prescription medications, are you taking any over-the-counter supplements?: Yes  If yes, what supplements are you taking?: calcium and chondroitin  Can you manage your medications?: Yes  Are you currently taking any opioid medications?: No  Can you walk and transfer into and out of your bed and chair?: Yes  Can you dress and groom yourself?: Yes  Can you bathe or shower yourself?: Yes  Can you feed yourself?: Yes  Can you do your laundry/ housekeeping?: Yes  Can you manage your money, pay your bills, and track your expenses?: Yes  Can you make your own meals?: Yes  Can you do your own shopping?: Yes  Within the last 12 months, have you had any hospitalizations or Emergency Department visits?: No  Do you have a living will?: Yes  Do you have a Durable POA (Power of ) for healthcare decisions?: Yes  Do you have an Advanced Directive for end of life decisions?: Yes  How often have you used an illegal drug (including marijuana) or a prescription medication for non-medical reasons in the past year?: never  What is the typical number of drinks you consume in a day?: 0  What is the typical number of drinks you consume in a week?: 0  How often did you have a drink containing alcohol in the past year?: monthly or less  How many drinks did you have on a typical day  when you were drinking in the past year?: 1 to 2  How often did you have 6 or more drinks on one occasion in the past year?: never       Patient ID: Alejandro Mejia is a 66 y o  female      HPI  This is a very pleasant 66 years young lady who is here today for the regular follow-up she is doing well problems are hypertension which is reasonably controlled but slight age higher systolic is higher than 990 so I will increase her hydrochlorothiazide to 25 mg from 12 5 mg and will see how the blood pressure goes  She does not have any symptoms of hyperthyroid  Lipid panel was excellent with the 20 mg of pravastatin once a day  Patient stop taking her Fosamax because of the abdominal discomfort and epigastric pain and it was recommended by GI to stop it  She is due for her DEXA scan will get the DEXA scan and then decide from there what to use most likely will consider Prolia if her bone density is going down there are no fracture recently and there is no fall and she is not high risk for the fall  She was complaining of some wheezing off and on no shortness of breath i e  Did the peak flow and it was 650 which is perfect for her age and her height and weight  I will see her back in about 3 months with the DEXA scan in this way I can assess the blood pressure again 2 I will give her the paper for the BMP to see how is the potassium doing with increasing the water pill  The following portions of the patient's history were reviewed and updated as appropriate: allergies, current medications, past family history, past medical history, past social history, past surgical history and problem list     Review of Systems   Constitutional: Negative for chills and fatigue  HENT: Negative for congestion, ear pain, hearing loss, postnasal drip, sinus pressure, sore throat and voice change  Eyes: Negative for pain, discharge and visual disturbance  Respiratory: Positive for wheezing  Negative for cough, chest tightness and shortness of breath  Snore   Cardiovascular: Negative for chest pain, palpitations and leg swelling  Gastrointestinal: Negative for abdominal pain, blood in stool, diarrhea, nausea and rectal pain  Genitourinary: Negative for difficulty urinating, dysuria and urgency  Musculoskeletal: Negative for arthralgias and joint swelling     Skin: Negative for rash  Allergic/Immunologic: Negative for environmental allergies and food allergies  Neurological: Negative for dizziness, tremors, weakness, numbness and headaches  Hematological: Negative for adenopathy  Psychiatric/Behavioral: Negative for behavioral problems and hallucinations           Past Medical History:   Diagnosis Date    Acid reflux     Allergic 1989    hay fever    Anxiety 2015    None now    Colon polyps     Hypertension     Migraine     Osteoporosis     Shingles 2011    had shot    Vitamin D deficiency 10/14/2013         Current Outpatient Medications:     calcium carbonate (OS-NEISHA) 1250 (500 Ca) MG chewable tablet, Chew 1 tablet (1,250 mg total) daily, Disp: 90 tablet, Rfl: 1    hydrochlorothiazide (HYDRODIURIL) 12 5 mg tablet, Take 1 tablet (12 5 mg total) by mouth daily, Disp: 90 tablet, Rfl: 1    losartan (COZAAR) 100 MG tablet, Take 1 tablet (100 mg total) by mouth daily, Disp: 90 tablet, Rfl: 1    metroNIDAZOLE (METROCREAM) 0 75 % cream, Apply topically 2 (two) times a day, Disp: , Rfl:     pravastatin (PRAVACHOL) 20 mg tablet, Take 1 tablet (20 mg total) by mouth daily, Disp: 90 tablet, Rfl: 1    Allergies   Allergen Reactions    Silicon Other (See Comments)     The silicon removed her skin    Omeprazole Other (See Comments)    Other Sneezing     Hayfever, red eyes       Social History   Past Surgical History:   Procedure Laterality Date    DILATION AND CURETTAGE OF UTERUS      EGD AND COLONOSCOPY      EYE SURGERY  2016    cataract    FRACTURE SURGERY  1974  2010    thigh shoulder    OTHER SURGICAL HISTORY  03/14/2016    Open Fracture Reduction    SHOULDER SURGERY      TOTAL SHOULDER REPLACEMENT       Family History   Problem Relation Age of Onset    Atrial fibrillation Brother     Colon cancer Family     Coronary artery disease Family     Diabetes Family         Diabetes Mellitus    Hyperlipidemia Family     Hypertension Family     Peripheral vascular disease Family     Stroke Family         Stroke Syndrome    Heart disease Mother     Arthritis Mother     Stroke Father     Alcohol abuse Father     No Known Problems Sister     No Known Problems Daughter     Arthritis Maternal Grandmother     No Known Problems Maternal Grandfather     Colon cancer Paternal Grandmother [de-identified]    Arthritis Paternal Grandmother     Alcohol abuse Paternal Grandfather     Prostate cancer Paternal Uncle 61    No Known Problems Maternal Aunt     No Known Problems Maternal Aunt     No Known Problems Paternal Aunt     Alcohol abuse Brother     Drug abuse Brother     Asthma Brother        Objective:  /80 (BP Location: Left arm, Patient Position: Sitting, Cuff Size: Standard)   Pulse 80   Temp 98 5 °F (36 9 °C) (Temporal)   Ht 5' 4" (1 626 m)   Wt 81 kg (178 lb 8 oz)   SpO2 98%   BMI 30 64 kg/m²        Physical Exam  Constitutional:       Appearance: She is well-developed  HENT:      Right Ear: External ear normal    Eyes:      Conjunctiva/sclera: Conjunctivae normal       Pupils: Pupils are equal, round, and reactive to light  Neck:      Thyroid: No thyromegaly  Vascular: No JVD  Cardiovascular:      Rate and Rhythm: Normal rate and regular rhythm  Heart sounds: Normal heart sounds  Pulmonary:      Breath sounds: Normal breath sounds  Abdominal:      General: Bowel sounds are normal       Palpations: Abdomen is soft  Musculoskeletal:         General: Normal range of motion  Cervical back: Normal range of motion  Lymphadenopathy:      Cervical: No cervical adenopathy  Skin:     General: Skin is dry  Neurological:      Mental Status: She is alert and oriented to person, place, and time  Deep Tendon Reflexes: Reflexes are normal and symmetric     Psychiatric:         Behavior: Behavior normal

## 2022-03-16 NOTE — PATIENT INSTRUCTIONS
Medicare Preventive Visit Patient Instructions  Thank you for completing your Welcome to Medicare Visit or Medicare Annual Wellness Visit today  Your next wellness visit will be due in one year (3/17/2023)  The screening/preventive services that you may require over the next 5-10 years are detailed below  Some tests may not apply to you based off risk factors and/or age  Screening tests ordered at today's visit but not completed yet may show as past due  Also, please note that scanned in results may not display below  Preventive Screenings:  Service Recommendations Previous Testing/Comments   Colorectal Cancer Screening  * Colonoscopy    * Fecal Occult Blood Test (FOBT)/Fecal Immunochemical Test (FIT)  * Fecal DNA/Cologuard Test  * Flexible Sigmoidoscopy Age: 54-65 years old   Colonoscopy: every 10 years (may be performed more frequently if at higher risk)  OR  FOBT/FIT: every 1 year  OR  Cologuard: every 3 years  OR  Sigmoidoscopy: every 5 years  Screening may be recommended earlier than age 48 if at higher risk for colorectal cancer  Also, an individualized decision between you and your healthcare provider will decide whether screening between the ages of 74-80 would be appropriate  Colonoscopy: 04/24/2013  FOBT/FIT: Not on file  Cologuard: Not on file  Sigmoidoscopy: Not on file    Screening Current     Breast Cancer Screening Age: 36 years old  Frequency: every 1-2 years  Not required if history of left and right mastectomy Mammogram: 09/17/2021    Screening Current   Cervical Cancer Screening Between the ages of 21-29, pap smear recommended once every 3 years  Between the ages of 33-67, can perform pap smear with HPV co-testing every 5 years     Recommendations may differ for women with a history of total hysterectomy, cervical cancer, or abnormal pap smears in past  Pap Smear: Not on file    Screening Not Indicated   Hepatitis C Screening Once for adults born between 1945 and 1965  More frequently in patients at high risk for Hepatitis C Hep C Antibody: 12/05/2019    Screening Current   Diabetes Screening 1-2 times per year if you're at risk for diabetes or have pre-diabetes Fasting glucose: 99 mg/dL   A1C: 5 7 %    Screening Current   Cholesterol Screening Once every 5 years if you don't have a lipid disorder  May order more often based on risk factors  Lipid panel: 03/09/2022    Screening Not Indicated  History Lipid Disorder     Other Preventive Screenings Covered by Medicare:  1  Abdominal Aortic Aneurysm (AAA) Screening: covered once if your at risk  You're considered to be at risk if you have a family history of AAA  2  Lung Cancer Screening: covers low dose CT scan once per year if you meet all of the following conditions: (1) Age 50-69; (2) No signs or symptoms of lung cancer; (3) Current smoker or have quit smoking within the last 15 years; (4) You have a tobacco smoking history of at least 30 pack years (packs per day multiplied by number of years you smoked); (5) You get a written order from a healthcare provider  3  Glaucoma Screening: covered annually if you're considered high risk: (1) You have diabetes OR (2) Family history of glaucoma OR (3)  aged 48 and older OR (3)  American aged 72 and older  3  Osteoporosis Screening: covered every 2 years if you meet one of the following conditions: (1) You're estrogen deficient and at risk for osteoporosis based off medical history and other findings; (2) Have a vertebral abnormality; (3) On glucocorticoid therapy for more than 3 months; (4) Have primary hyperparathyroidism; (5) On osteoporosis medications and need to assess response to drug therapy  · Last bone density test (DXA Scan): 02/02/2019   5  HIV Screening: covered annually if you're between the age of 15-65  Also covered annually if you are younger than 13 and older than 72 with risk factors for HIV infection   For pregnant patients, it is covered up to 3 times per pregnancy  Immunizations:  Immunization Recommendations   Influenza Vaccine Annual influenza vaccination during flu season is recommended for all persons aged >= 6 months who do not have contraindications   Pneumococcal Vaccine (Prevnar and Pneumovax)  * Prevnar = PCV13  * Pneumovax = PPSV23   Adults 25-60 years old: 1-3 doses may be recommended based on certain risk factors  Adults 72 years old: Prevnar (PCV13) vaccine recommended followed by Pneumovax (PPSV23) vaccine  If already received PPSV23 since turning 65, then PCV13 recommended at least one year after PPSV23 dose  Hepatitis B Vaccine 3 dose series if at intermediate or high risk (ex: diabetes, end stage renal disease, liver disease)   Tetanus (Td) Vaccine - COST NOT COVERED BY MEDICARE PART B Following completion of primary series, a booster dose should be given every 10 years to maintain immunity against tetanus  Td may also be given as tetanus wound prophylaxis  Tdap Vaccine - COST NOT COVERED BY MEDICARE PART B Recommended at least once for all adults  For pregnant patients, recommended with each pregnancy  Shingles Vaccine (Shingrix) - COST NOT COVERED BY MEDICARE PART B  2 shot series recommended in those aged 48 and above     Health Maintenance Due:      Topic Date Due    Breast Cancer Screening: Mammogram  09/17/2022    Colorectal Cancer Screening  04/24/2023    Hepatitis C Screening  Completed     Immunizations Due:      Topic Date Due    DTaP,Tdap,and Td Vaccines (2 - Td or Tdap) 02/04/2020    COVID-19 Vaccine (3 - Booster for Pfizer series) 08/24/2021     Advance Directives   What are advance directives? Advance directives are legal documents that state your wishes and plans for medical care  These plans are made ahead of time in case you lose your ability to make decisions for yourself  Advance directives can apply to any medical decision, such as the treatments you want, and if you want to donate organs     What are the types of advance directives? There are many types of advance directives, and each state has rules about how to use them  You may choose a combination of any of the following:  · Living will: This is a written record of the treatment you want  You can also choose which treatments you do not want, which to limit, and which to stop at a certain time  This includes surgery, medicine, IV fluid, and tube feedings  · Durable power of  for healthcare [facility] [place]): This is a written record that states who you want to make healthcare choices for you when you are unable to make them for yourself  This person, called a proxy, is usually a family member or a friend  You may choose more than 1 proxy  · Do not resuscitate (DNR) order:  A DNR order is used in case your heart stops beating or you stop breathing  It is a request not to have certain forms of treatment, such as CPR  A DNR order may be included in other types of advance directives  · Medical directive: This covers the care that you want if you are in a coma, near death, or unable to make decisions for yourself  You can list the treatments you want for each condition  Treatment may include pain medicine, surgery, blood transfusions, dialysis, IV or tube feedings, and a ventilator (breathing machine)  · Values history: This document has questions about your views, beliefs, and how you feel and think about life  This information can help others choose the care that you would choose  Why are advance directives important? An advance directive helps you control your care  Although spoken wishes may be used, it is better to have your wishes written down  Spoken wishes can be misunderstood, or not followed  Treatments may be given even if you do not want them  An advance directive may make it easier for your family to make difficult choices about your care     Weight Management   Why it is important to manage your weight:  Being overweight increases your risk of health conditions such as heart disease, high blood pressure, type 2 diabetes, and certain types of cancer  It can also increase your risk for osteoarthritis, sleep apnea, and other respiratory problems  Aim for a slow, steady weight loss  Even a small amount of weight loss can lower your risk of health problems  How to lose weight safely:  A safe and healthy way to lose weight is to eat fewer calories and get regular exercise  You can lose up about 1 pound a week by decreasing the number of calories you eat by 500 calories each day  Healthy meal plan for weight management:  A healthy meal plan includes a variety of foods, contains fewer calories, and helps you stay healthy  A healthy meal plan includes the following:  · Eat whole-grain foods more often  A healthy meal plan should contain fiber  Fiber is the part of grains, fruits, and vegetables that is not broken down by your body  Whole-grain foods are healthy and provide extra fiber in your diet  Some examples of whole-grain foods are whole-wheat breads and pastas, oatmeal, brown rice, and bulgur  · Eat a variety of vegetables every day  Include dark, leafy greens such as spinach, kale, tristen greens, and mustard greens  Eat yellow and orange vegetables such as carrots, sweet potatoes, and winter squash  · Eat a variety of fruits every day  Choose fresh or canned fruit (canned in its own juice or light syrup) instead of juice  Fruit juice has very little or no fiber  · Eat low-fat dairy foods  Drink fat-free (skim) milk or 1% milk  Eat fat-free yogurt and low-fat cottage cheese  Try low-fat cheeses such as mozzarella and other reduced-fat cheeses  · Choose meat and other protein foods that are low in fat  Choose beans or other legumes such as split peas or lentils  Choose fish, skinless poultry (chicken or turkey), or lean cuts of red meat (beef or pork)  Before you cook meat or poultry, cut off any visible fat  · Use less fat and oil    Try baking foods instead of frying them  Add less fat, such as margarine, sour cream, regular salad dressing and mayonnaise to foods  Eat fewer high-fat foods  Some examples of high-fat foods include french fries, doughnuts, ice cream, and cakes  · Eat fewer sweets  Limit foods and drinks that are high in sugar  This includes candy, cookies, regular soda, and sweetened drinks  Exercise:  Exercise at least 30 minutes per day on most days of the week  Some examples of exercise include walking, biking, dancing, and swimming  You can also fit in more physical activity by taking the stairs instead of the elevator or parking farther away from stores  Ask your healthcare provider about the best exercise plan for you  © Copyright [employer] 2018 Information is for End User's use only and may not be sold, redistributed or otherwise used for commercial purposes   All illustrations and images included in CareNotes® are the copyrighted property of [employer]  or [address] [place] [address]

## 2022-03-18 ENCOUNTER — TELEPHONE (OUTPATIENT)
Dept: ADMINISTRATIVE | Facility: OTHER | Age: 72
End: 2022-03-18

## 2022-03-18 NOTE — TELEPHONE ENCOUNTER
----- Message from Tha Leslie MA sent at 3/16/2022  2:48 PM EDT -----  Regarding: colonoscopy  03/16/22 2:48 PM    Hello, our patient Luis Fernando Flowers has had CRC: Colonoscopy completed/performed  Please assist in updating the patient chart by pulling the document from the Media Tab  The date of service is 01/19/2022       Thank you,  Tha Leslie MA  [facility]

## 2022-03-18 NOTE — TELEPHONE ENCOUNTER
Upon review of the In Basket request we were able to locate, review, and update the patient chart as requested for CRC: Colonoscopy  Any additional questions or concerns should be emailed to the Practice Liaisons via [email or web address] email, please do not reply via In Basket      Thank you  Christy Andrea

## 2022-05-23 DIAGNOSIS — E78.5 HYPERLIPIDEMIA, UNSPECIFIED HYPERLIPIDEMIA TYPE: ICD-10-CM

## 2022-05-23 RX ORDER — PRAVASTATIN SODIUM 20 MG
20 TABLET ORAL DAILY
Qty: 90 TABLET | Refills: 1 | Status: SHIPPED | OUTPATIENT
Start: 2022-05-23

## 2022-06-09 ENCOUNTER — APPOINTMENT (OUTPATIENT)
Dept: LAB | Age: 72
End: 2022-06-09
Payer: MEDICARE

## 2022-06-09 DIAGNOSIS — I10 BENIGN ESSENTIAL HYPERTENSION: ICD-10-CM

## 2022-06-09 LAB
ANION GAP SERPL CALCULATED.3IONS-SCNC: 8 MMOL/L (ref 4–13)
BUN SERPL-MCNC: 17 MG/DL (ref 5–25)
CALCIUM SERPL-MCNC: 9.5 MG/DL (ref 8.3–10.1)
CHLORIDE SERPL-SCNC: 104 MMOL/L (ref 100–108)
CO2 SERPL-SCNC: 28 MMOL/L (ref 21–32)
CREAT SERPL-MCNC: 0.81 MG/DL (ref 0.6–1.3)
GFR SERPL CREATININE-BSD FRML MDRD: 72 ML/MIN/1.73SQ M
GLUCOSE P FAST SERPL-MCNC: 95 MG/DL (ref 65–99)
POTASSIUM SERPL-SCNC: 4.1 MMOL/L (ref 3.5–5.3)
SODIUM SERPL-SCNC: 140 MMOL/L (ref 136–145)

## 2022-06-09 PROCEDURE — 80048 BASIC METABOLIC PNL TOTAL CA: CPT

## 2022-06-09 PROCEDURE — 36415 COLL VENOUS BLD VENIPUNCTURE: CPT

## 2022-06-13 ENCOUNTER — RA CDI HCC (OUTPATIENT)
Dept: OTHER | Facility: HOSPITAL | Age: 72
End: 2022-06-13

## 2022-06-13 NOTE — PROGRESS NOTES
[address]  coding opportunities       Chart reviewed, no opportunity found: CHART REVIEWED, NO OPPORTUNITY FOUND        Patients Insurance     Medicare Insurance: Medicare

## 2022-06-16 ENCOUNTER — OFFICE VISIT (OUTPATIENT)
Dept: INTERNAL MEDICINE CLINIC | Age: 72
End: 2022-06-16
Payer: MEDICARE

## 2022-06-16 VITALS
TEMPERATURE: 98.1 F | HEIGHT: 64 IN | DIASTOLIC BLOOD PRESSURE: 80 MMHG | BODY MASS INDEX: 29.79 KG/M2 | WEIGHT: 174.5 LBS | OXYGEN SATURATION: 98 % | SYSTOLIC BLOOD PRESSURE: 150 MMHG | HEART RATE: 75 BPM

## 2022-06-16 DIAGNOSIS — L71.9 ROSACEA: Primary | ICD-10-CM

## 2022-06-16 DIAGNOSIS — I10 BENIGN ESSENTIAL HYPERTENSION: ICD-10-CM

## 2022-06-16 DIAGNOSIS — G43.009 MIGRAINE WITHOUT AURA AND WITHOUT STATUS MIGRAINOSUS, NOT INTRACTABLE: ICD-10-CM

## 2022-06-16 DIAGNOSIS — E78.5 HYPERLIPIDEMIA, UNSPECIFIED HYPERLIPIDEMIA TYPE: ICD-10-CM

## 2022-06-16 PROCEDURE — 99214 OFFICE O/P EST MOD 30 MIN: CPT | Performed by: INTERNAL MEDICINE

## 2022-06-16 RX ORDER — LOSARTAN POTASSIUM 100 MG/1
100 TABLET ORAL DAILY
Qty: 90 TABLET | Refills: 1 | Status: SHIPPED | OUTPATIENT
Start: 2022-06-16

## 2022-06-16 NOTE — PROGRESS NOTES
Assessment/Plan:     Diagnoses and all orders for this visit:    Rosacea  -     metroNIDAZOLE (METROCREAM) 0 75 % cream; Apply topically 2 (two) times a day    Benign essential hypertension  Comments:  well controlled, continue meds as outlined  Orders:  -     losartan (COZAAR) 100 MG tablet; Take 1 tablet (100 mg total) by mouth daily  Continue with the same medication home blood pressure monitoring and follow-up in 3 months  Migraine without aura and without status migrainosus, not intractable  Better  Hyperlipidemia, unspecified hyperlipidemia type      Follow-up with the lipid panel       M*Modal software was used to dictate this note  It may contain errors with dictating incorrect words or incorrect spelling  Please contact the provider directly with any questions  Subjective:   Chief Complaint   Patient presents with    Follow-up     Follow up chronic conditions  Follow up labs done due to changes in BP med  Patient ID: Ekaterina Koo is a 66 y o  female  HPI  Pleasant 66 years young lady who is here today for the regular follow-up she is doing well review of system is essentially unremarkable we discussed about her blood pressure her blood pressure was most of the time was very good this time in the last time the blood pressure is high I took the blood pressure again and it was better I told her to check her blood pressure readings at home and see how she does with that and then bring the numbers to me I will follow her up in about 3 months to see how the blood pressure is at home and also in the office  Migraines are much better  Osteoporosis she is scheduled for DEXA scan she was on Fosamax which affected her stomach and now the stomach is much better without Fosamax but we will re-evaluate her DEXA scan and will decide from there to put her on some medication for osteoporosis    Rosacea was doing very well now on the rash is coming back she will start the medication I gave her the refills  The following portions of the patient's history were reviewed and updated as appropriate: allergies, current medications, past family history, past medical history, past social history, past surgical history and problem list     Review of Systems   Constitutional: Negative for chills and fatigue  HENT: Negative for congestion, ear pain, hearing loss, postnasal drip, sinus pressure, sore throat and voice change  Eyes: Negative for pain, discharge and visual disturbance  Respiratory: Negative for cough, chest tightness and shortness of breath  Cardiovascular: Negative for chest pain, palpitations and leg swelling  Gastrointestinal: Negative for abdominal pain, blood in stool, diarrhea, nausea and rectal pain  Genitourinary: Negative for difficulty urinating, dysuria and urgency  Musculoskeletal: Negative for arthralgias and joint swelling  Skin: Negative for rash  Allergic/Immunologic: Negative for environmental allergies and food allergies  Neurological: Negative for dizziness, tremors, weakness, numbness and headaches  Hematological: Negative for adenopathy  Psychiatric/Behavioral: Negative for behavioral problems and hallucinations           Past Medical History:   Diagnosis Date    Acid reflux     Allergic 1989    hay fever    Anxiety 2015    None now    Colon polyps     Hypertension     Migraine     Osteoporosis     Shingles 2011    had shot    Vitamin D deficiency 10/14/2013         Current Outpatient Medications:     calcium carbonate (OS-ENISHA) 1250 (500 Ca) MG chewable tablet, Chew 1 tablet (1,250 mg total) daily, Disp: 90 tablet, Rfl: 1    hydrochlorothiazide (HYDRODIURIL) 25 mg tablet, Take 1 tablet (25 mg total) by mouth daily, Disp: 90 tablet, Rfl: 3    losartan (COZAAR) 100 MG tablet, Take 1 tablet (100 mg total) by mouth daily, Disp: 90 tablet, Rfl: 1    metroNIDAZOLE (METROCREAM) 0 75 % cream, Apply topically 2 (two) times a day, Disp: 45 g, Rfl: 1   pravastatin (PRAVACHOL) 20 mg tablet, Take 1 tablet (20 mg total) by mouth in the morning , Disp: 90 tablet, Rfl: 1    Allergies   Allergen Reactions    Silicon Other (See Comments)     The silicon removed her skin    Omeprazole Other (See Comments)    Other Sneezing     Hayfever, red eyes       Social History   Past Surgical History:   Procedure Laterality Date    DILATION AND CURETTAGE OF UTERUS      EGD AND COLONOSCOPY      EYE SURGERY  2016    cataract    FRACTURE SURGERY  1974  2010    thigh shoulder    OTHER SURGICAL HISTORY  03/14/2016    Open Fracture Reduction    SHOULDER SURGERY      TOTAL SHOULDER REPLACEMENT       Family History   Problem Relation Age of Onset    Atrial fibrillation Brother     Colon cancer Family     Coronary artery disease Family     Diabetes Family         Diabetes Mellitus    Hyperlipidemia Family     Hypertension Family     Peripheral vascular disease Family     Stroke Family         Stroke Syndrome    Heart disease Mother     Arthritis Mother     Stroke Father     Alcohol abuse Father     No Known Problems Sister     No Known Problems Daughter     Arthritis Maternal Grandmother     No Known Problems Maternal Grandfather     Colon cancer Paternal Grandmother [de-identified]    Arthritis Paternal Grandmother     Alcohol abuse Paternal Grandfather     Prostate cancer Paternal Uncle 61    No Known Problems Maternal Aunt     No Known Problems Maternal Aunt     No Known Problems Paternal Aunt     Alcohol abuse Brother     Drug abuse Brother     Asthma Brother        Objective:  /80   Pulse 75   Temp 98 1 °F (36 7 °C) (Temporal)   Ht 5' 4" (1 626 m)   Wt 79 2 kg (174 lb 8 oz)   SpO2 98% Comment: room air  BMI 29 95 kg/m²        Physical Exam  Constitutional:       Appearance: She is well-developed     HENT:      Right Ear: External ear normal    Eyes:      Conjunctiva/sclera: Conjunctivae normal       Pupils: Pupils are equal, round, and reactive to light    Neck:      Thyroid: No thyromegaly  Vascular: No JVD  Cardiovascular:      Rate and Rhythm: Normal rate and regular rhythm  Heart sounds: Normal heart sounds  Pulmonary:      Breath sounds: Normal breath sounds  Abdominal:      General: Bowel sounds are normal       Palpations: Abdomen is soft  Musculoskeletal:         General: Normal range of motion  Cervical back: Normal range of motion  Lymphadenopathy:      Cervical: No cervical adenopathy  Skin:     General: Skin is dry  Neurological:      Mental Status: She is alert and oriented to person, place, and time  Deep Tendon Reflexes: Reflexes are normal and symmetric     Psychiatric:         Behavior: Behavior normal

## 2022-08-01 ENCOUNTER — HOSPITAL ENCOUNTER (OUTPATIENT)
Dept: RADIOLOGY | Age: 72
Discharge: HOME | End: 2022-08-01
Payer: MEDICARE

## 2022-08-01 DIAGNOSIS — M85.851 OSTEOPENIA OF BOTH HIPS: ICD-10-CM

## 2022-08-01 DIAGNOSIS — M85.852 OSTEOPENIA OF BOTH HIPS: ICD-10-CM

## 2022-08-01 PROCEDURE — 77080 DXA BONE DENSITY AXIAL: CPT

## 2022-09-26 ENCOUNTER — OFFICE VISIT (OUTPATIENT)
Dept: INTERNAL MEDICINE CLINIC | Age: 72
End: 2022-09-26
Payer: MEDICARE

## 2022-09-26 VITALS
TEMPERATURE: 98.1 F | OXYGEN SATURATION: 95 % | BODY MASS INDEX: 28.73 KG/M2 | SYSTOLIC BLOOD PRESSURE: 128 MMHG | RESPIRATION RATE: 18 BRPM | HEIGHT: 64 IN | HEART RATE: 62 BPM | DIASTOLIC BLOOD PRESSURE: 80 MMHG | WEIGHT: 168.3 LBS

## 2022-09-26 DIAGNOSIS — M81.0 AGE-RELATED OSTEOPOROSIS WITHOUT CURRENT PATHOLOGICAL FRACTURE: ICD-10-CM

## 2022-09-26 DIAGNOSIS — Z12.31 ENCOUNTER FOR SCREENING MAMMOGRAM FOR MALIGNANT NEOPLASM OF BREAST: Primary | ICD-10-CM

## 2022-09-26 DIAGNOSIS — I10 BENIGN ESSENTIAL HYPERTENSION: ICD-10-CM

## 2022-09-26 DIAGNOSIS — E55.9 VITAMIN D DEFICIENCY: ICD-10-CM

## 2022-09-26 PROCEDURE — 99214 OFFICE O/P EST MOD 30 MIN: CPT | Performed by: INTERNAL MEDICINE

## 2022-09-26 RX ORDER — LOSARTAN POTASSIUM 100 MG/1
100 TABLET ORAL DAILY
Qty: 90 TABLET | Refills: 1 | Status: SHIPPED | OUTPATIENT
Start: 2022-09-26

## 2022-09-26 NOTE — PROGRESS NOTES
Assessment/Plan:     Diagnoses and all orders for this visit:    Encounter for screening mammogram for malignant neoplasm of breast  -     Mammo screening bilateral w 3d & cad; Future    Benign essential hypertension  Comments:  well controlled, continue meds as outlined  Orders:  -     losartan (COZAAR) 100 MG tablet; Take 1 tablet (100 mg total) by mouth daily    Age-related osteoporosis without current pathological fracture  -     Basic metabolic panel; Future  -     Vitamin D 25 hydroxy; Future    Vitamin D deficiency  -     Basic metabolic panel; Future  -     Vitamin D 25 hydroxy; Future             M*Modal software was used to dictate this note  It may contain errors with dictating incorrect words or incorrect spelling  Please contact the provider directly with any questions  Subjective:   Chief Complaint   Patient presents with    Follow-up     3 month, no labs due had a dexa done on 8/1/22,          Mammo, bmi f/u plan    Hypertension     Has a log for 8/22 on        Patient ID: Eloisa Branham is a 66 y o  female  HPI  This is a very pleasant 66 years young lady who is here today for the regular follow-up we did the DEXA scan and her T-scores are -1 8 she was not able to tolerate her once a week alendronate because of the abdominal symptoms and the GI symptoms  The following portions of the patient's history were reviewed and updated as appropriate: allergies, current medications, past family history, past medical history, past social history, past surgical history and problem list     Review of Systems   Constitutional: Negative for chills and fatigue  HENT: Negative for congestion, ear pain, hearing loss, postnasal drip, sinus pressure, sore throat and voice change  Eyes: Negative for pain, discharge and visual disturbance  Respiratory: Negative for cough, chest tightness and shortness of breath  Cardiovascular: Negative for chest pain, palpitations and leg swelling  Gastrointestinal: Negative for abdominal pain, blood in stool, diarrhea, nausea and rectal pain  Genitourinary: Negative for difficulty urinating, dysuria and urgency  Musculoskeletal: Negative for arthralgias and joint swelling  Skin: Negative for rash  Allergic/Immunologic: Negative for environmental allergies and food allergies  Neurological: Negative for dizziness, tremors, weakness, numbness and headaches  Hematological: Negative for adenopathy  Psychiatric/Behavioral: Negative for behavioral problems and hallucinations           Past Medical History:   Diagnosis Date    Acid reflux     Allergic 1989    hay fever    Anxiety 2015    None now    Colon polyps     COVID-19 09/02/2022    Hypertension     Migraine     Osteoporosis     Shingles 2011    had shot    Vitamin D deficiency 10/14/2013         Current Outpatient Medications:     calcium carbonate (OS-NEISHA) 1250 (500 Ca) MG chewable tablet, Chew 1 tablet (1,250 mg total) daily, Disp: 90 tablet, Rfl: 1    hydrochlorothiazide (HYDRODIURIL) 25 mg tablet, Take 1 tablet (25 mg total) by mouth daily, Disp: 90 tablet, Rfl: 3    losartan (COZAAR) 100 MG tablet, Take 1 tablet (100 mg total) by mouth daily, Disp: 90 tablet, Rfl: 1    metroNIDAZOLE (METROCREAM) 0 75 % cream, Apply topically 2 (two) times a day, Disp: 45 g, Rfl: 1    pravastatin (PRAVACHOL) 20 mg tablet, Take 1 tablet (20 mg total) by mouth in the morning , Disp: 90 tablet, Rfl: 1    Allergies   Allergen Reactions    Silicon Other (See Comments)     The silicon removed her skin    Omeprazole Other (See Comments)    Other Sneezing     Hayfever, red eyes       Social History   Past Surgical History:   Procedure Laterality Date    DILATION AND CURETTAGE OF UTERUS      EGD AND COLONOSCOPY      EYE SURGERY  2016    cataract    FRACTURE SURGERY  1974  2010    thigh shoulder    OTHER SURGICAL HISTORY  03/14/2016    Open Fracture Reduction    SHOULDER SURGERY      TOTAL SHOULDER REPLACEMENT       Family History   Problem Relation Age of Onset    Heart disease Mother     Arthritis Mother     Stroke Father     Alcohol abuse Father     No Known Problems Sister     Atrial fibrillation Brother     Alcohol abuse Brother     Drug abuse Brother     Asthma Brother     Heart attack Son     No Known Problems Daughter     Arthritis Maternal Grandmother     No Known Problems Maternal Grandfather     Colon cancer Paternal Grandmother [de-identified]    Arthritis Paternal Grandmother     Alcohol abuse Paternal Grandfather     No Known Problems Maternal Aunt     No Known Problems Maternal Aunt     No Known Problems Paternal Aunt     Prostate cancer Paternal Uncle 61    Colon cancer Family     Coronary artery disease Family     Diabetes Family         Diabetes Mellitus    Hyperlipidemia Family     Hypertension Family     Peripheral vascular disease Family     Stroke Family         Stroke Syndrome       Objective:  /80 (BP Location: Left arm, Patient Position: Sitting, Cuff Size: Standard)   Pulse 62   Temp 98 1 °F (36 7 °C) (Temporal)   Resp 18   Ht 5' 4" (1 626 m)   Wt 76 3 kg (168 lb 4 8 oz)   SpO2 95%   BMI 28 89 kg/m²        Physical Exam  Constitutional:       Appearance: She is well-developed  HENT:      Right Ear: External ear normal    Eyes:      Conjunctiva/sclera: Conjunctivae normal       Pupils: Pupils are equal, round, and reactive to light  Neck:      Thyroid: No thyromegaly  Vascular: No JVD  Cardiovascular:      Rate and Rhythm: Normal rate and regular rhythm  Heart sounds: Normal heart sounds  Pulmonary:      Breath sounds: Normal breath sounds  Abdominal:      General: Bowel sounds are normal       Palpations: Abdomen is soft  Musculoskeletal:         General: Normal range of motion  Cervical back: Normal range of motion  Lymphadenopathy:      Cervical: No cervical adenopathy  Skin:     General: Skin is dry     Neurological: Mental Status: She is alert and oriented to person, place, and time  Deep Tendon Reflexes: Reflexes are normal and symmetric     Psychiatric:         Behavior: Behavior normal

## 2022-10-05 ENCOUNTER — APPOINTMENT (OUTPATIENT)
Dept: LAB | Age: 72
End: 2022-10-05
Payer: MEDICARE

## 2022-10-05 DIAGNOSIS — M81.0 AGE-RELATED OSTEOPOROSIS WITHOUT CURRENT PATHOLOGICAL FRACTURE: ICD-10-CM

## 2022-10-05 DIAGNOSIS — E55.9 VITAMIN D DEFICIENCY: ICD-10-CM

## 2022-10-05 LAB
25(OH)D3 SERPL-MCNC: 36.4 NG/ML (ref 30–100)
ANION GAP SERPL CALCULATED.3IONS-SCNC: 8 MMOL/L (ref 4–13)
BUN SERPL-MCNC: 21 MG/DL (ref 5–25)
CALCIUM SERPL-MCNC: 9.9 MG/DL (ref 8.3–10.1)
CHLORIDE SERPL-SCNC: 105 MMOL/L (ref 96–108)
CO2 SERPL-SCNC: 27 MMOL/L (ref 21–32)
CREAT SERPL-MCNC: 0.82 MG/DL (ref 0.6–1.3)
GFR SERPL CREATININE-BSD FRML MDRD: 71 ML/MIN/1.73SQ M
GLUCOSE P FAST SERPL-MCNC: 97 MG/DL (ref 65–99)
POTASSIUM SERPL-SCNC: 3.9 MMOL/L (ref 3.5–5.3)
SODIUM SERPL-SCNC: 140 MMOL/L (ref 135–147)

## 2022-10-05 PROCEDURE — 36415 COLL VENOUS BLD VENIPUNCTURE: CPT

## 2022-10-05 PROCEDURE — 80048 BASIC METABOLIC PNL TOTAL CA: CPT

## 2022-10-05 PROCEDURE — 82306 VITAMIN D 25 HYDROXY: CPT

## 2022-10-14 ENCOUNTER — TELEPHONE (OUTPATIENT)
Dept: INTERNAL MEDICINE CLINIC | Age: 72
End: 2022-10-14

## 2022-10-14 NOTE — TELEPHONE ENCOUNTER
QUINTONOM to have patient call and schedule the Prolia injection with the office  Her insurance covers it 100%  Wanted to get her in to get the injection before the end of the year    Stated to call the office and speak with Gilbert Alan

## 2022-10-17 NOTE — TELEPHONE ENCOUNTER
Pt called the office and stated that she does not wish to go through with the prolia injection at this time

## 2022-11-11 ENCOUNTER — HOSPITAL ENCOUNTER (OUTPATIENT)
Dept: RADIOLOGY | Age: 72
Discharge: HOME | End: 2022-11-11

## 2022-11-11 VITALS — BODY MASS INDEX: 28.68 KG/M2 | WEIGHT: 168 LBS | HEIGHT: 64 IN

## 2022-11-11 DIAGNOSIS — Z12.31 ENCOUNTER FOR SCREENING MAMMOGRAM FOR MALIGNANT NEOPLASM OF BREAST: ICD-10-CM

## 2023-01-11 DIAGNOSIS — E78.5 HYPERLIPIDEMIA, UNSPECIFIED HYPERLIPIDEMIA TYPE: ICD-10-CM

## 2023-01-11 RX ORDER — PRAVASTATIN SODIUM 20 MG
20 TABLET ORAL DAILY
Qty: 90 TABLET | Refills: 1 | Status: SHIPPED | OUTPATIENT
Start: 2023-01-11

## 2023-03-29 ENCOUNTER — OFFICE VISIT (OUTPATIENT)
Dept: INTERNAL MEDICINE CLINIC | Age: 73
End: 2023-03-29

## 2023-03-29 VITALS
HEIGHT: 64 IN | OXYGEN SATURATION: 98 % | WEIGHT: 170 LBS | TEMPERATURE: 98.2 F | SYSTOLIC BLOOD PRESSURE: 150 MMHG | HEART RATE: 89 BPM | DIASTOLIC BLOOD PRESSURE: 70 MMHG | BODY MASS INDEX: 29.02 KG/M2

## 2023-03-29 DIAGNOSIS — E78.5 HYPERLIPIDEMIA, UNSPECIFIED HYPERLIPIDEMIA TYPE: ICD-10-CM

## 2023-03-29 DIAGNOSIS — I10 BENIGN ESSENTIAL HYPERTENSION: ICD-10-CM

## 2023-03-29 DIAGNOSIS — R73.01 IMPAIRED FASTING BLOOD SUGAR: ICD-10-CM

## 2023-03-29 DIAGNOSIS — E55.9 VITAMIN D DEFICIENCY: ICD-10-CM

## 2023-03-29 DIAGNOSIS — M81.0 AGE-RELATED OSTEOPOROSIS WITHOUT CURRENT PATHOLOGICAL FRACTURE: Primary | ICD-10-CM

## 2023-03-29 DIAGNOSIS — Z00.00 MEDICARE ANNUAL WELLNESS VISIT, SUBSEQUENT: ICD-10-CM

## 2023-03-29 RX ORDER — LOSARTAN POTASSIUM 100 MG/1
100 TABLET ORAL DAILY
Qty: 90 TABLET | Refills: 1 | Status: SHIPPED | OUTPATIENT
Start: 2023-03-29

## 2023-03-29 RX ORDER — LANOLIN ALCOHOL/MO/W.PET/CERES
1 CREAM (GRAM) TOPICAL 3 TIMES DAILY
COMMUNITY

## 2023-03-29 RX ORDER — B-COMPLEX WITH VITAMIN C
1 TABLET ORAL 2 TIMES DAILY WITH MEALS
COMMUNITY

## 2023-03-29 RX ORDER — HYDROCHLOROTHIAZIDE 25 MG/1
25 TABLET ORAL DAILY
Qty: 90 TABLET | Refills: 3 | Status: SHIPPED | OUTPATIENT
Start: 2023-03-29

## 2023-03-29 RX ORDER — PRAVASTATIN SODIUM 20 MG
20 TABLET ORAL DAILY
Qty: 90 TABLET | Refills: 1 | Status: SHIPPED | OUTPATIENT
Start: 2023-03-29

## 2023-03-29 NOTE — PROGRESS NOTES
Assessment and Plan:     Problem List Items Addressed This Visit    None    BMI Counseling: Body mass index is 29 18 kg/m²  The BMI is above normal  Nutrition recommendations include decreasing portion sizes, encouraging healthy choices of fruits and vegetables and moderation in carbohydrate intake  Exercise recommendations include moderate physical activity 150 minutes/week  Rationale for BMI follow-up plan is due to patient being overweight or obese  Depression Screening and Follow-up Plan: Patient was screened for depression during today's encounter  They screened negative with a PHQ-2 score of 0  Preventive health issues were discussed with patient, and age appropriate screening tests were ordered as noted in patient's After Visit Summary  Personalized health advice and appropriate referrals for health education or preventive services given if needed, as noted in patient's After Visit Summary  History of Present Illness:     Patient presents for a Medicare Wellness Visit    This is a very pleasant 74 years young lady who is here today for the regular follow-up Medicare wellness visit  She is pretty much up-to-date on her Medicare wellness preventive care    Blood pressure is slightly elevated diastolic blood pressure is excellent I think it is all because she is moving out of the area and doing a lot of work at home will follow-up or she will check her blood pressure at home and see how it is going and let us know    We will follow-up with the lipid panel last lipid panel was excellent    Mild aortic stenosis with no other signs or symptoms of cardiac problems    Osteoarthritis of the knee was seen by the orthopedics and received injections    Overall this young lady is doing well we will continue with the same medication get the blood work-up and follow-up in in 6-month she may be able to get a new physician in the area where she is moving      Osteoporosis patient is not taking any medication she was a scared of taking the medication but she will consider when she moved into the new place     Patient Care Team:  Beka Chandra MD as PCP - General (Internal Medicine)     Review of Systems:     Review of Systems   Constitutional: Negative for chills and fatigue  HENT: Negative for congestion, ear pain, hearing loss, postnasal drip, sinus pressure, sore throat and voice change  Eyes: Negative for pain, discharge and visual disturbance  Respiratory: Negative for cough, chest tightness and shortness of breath  Cardiovascular: Negative for chest pain, palpitations and leg swelling  Gastrointestinal: Negative for abdominal pain, blood in stool, diarrhea, nausea and rectal pain  Genitourinary: Negative for difficulty urinating, dysuria and urgency  Musculoskeletal: Positive for arthralgias (knss and hip pain)  Negative for joint swelling  Skin: Negative for rash  Allergic/Immunologic: Negative for environmental allergies and food allergies  Neurological: Negative for dizziness, tremors, weakness, numbness and headaches  Hematological: Negative for adenopathy  Psychiatric/Behavioral: Negative for behavioral problems and hallucinations          Problem List:     Patient Active Problem List   Diagnosis   • Allergic rhinitis   • Benign essential hypertension   • Common migraine without aura   • Esophageal reflux   • Hyperlipidemia   • Lipoma of neck   • Osteoporosis   • Lower back pain   • Rosacea   • Right knee pain   • Imbalance   • Vitamin D deficiency      Past Medical and Surgical History:     Past Medical History:   Diagnosis Date   • Acid reflux    • Allergic 1989    hay fever   • Anxiety 2015    None now   • Colon polyps    • COVID-19 09/02/2022   • Hypertension    • Migraine    • Osteoporosis    • Shingles 2011    had shot   • Vitamin D deficiency 10/14/2013     Past Surgical History:   Procedure Laterality Date   • DILATION AND CURETTAGE OF UTERUS     • EGD AND COLONOSCOPY     • EYE SURGERY  2016    cataract   • FRACTURE SURGERY  1974      thigh shoulder   • OTHER SURGICAL HISTORY  2016    Open Fracture Reduction   • SHOULDER SURGERY     • TOTAL SHOULDER REPLACEMENT        Family History:     Family History   Problem Relation Age of Onset   • Heart disease Mother    • Arthritis Mother    • Stroke Father    • Alcohol abuse Father    • No Known Problems Sister    • No Known Problems Daughter    • Arthritis Maternal Grandmother    • No Known Problems Maternal Grandfather    • Colon cancer Paternal Grandmother [de-identified]   • Arthritis Paternal Grandmother    • Alcohol abuse Paternal Grandfather    • Atrial fibrillation Brother    • Alcohol abuse Brother    • Drug abuse Brother    • Asthma Brother    • Heart attack Son    • No Known Problems Maternal Aunt    • No Known Problems Maternal Aunt    • No Known Problems Paternal Aunt    • Prostate cancer Paternal Uncle 61   • Colon cancer Family    • Coronary artery disease Family    • Diabetes Family         Diabetes Mellitus   • Hyperlipidemia Family    • Hypertension Family    • Peripheral vascular disease Family    • Stroke Family         Stroke Syndrome   • Breast cancer Cousin 54      Social History:     Social History     Socioeconomic History   • Marital status:       Spouse name: Not on file   • Number of children: Not on file   • Years of education: Not on file   • Highest education level: Not on file   Occupational History   • Not on file   Tobacco Use   • Smoking status: Former     Packs/day: 2 00     Years: 15 00     Pack years: 30 00     Types: Cigarettes     Quit date: 1991     Years since quittin 2   • Smokeless tobacco: Never   • Tobacco comments:     Quite 25 yrs ago - Former heavy smoker; 2-3 ppd   Vaping Use   • Vaping Use: Never used   Substance and Sexual Activity   • Alcohol use: No   • Drug use: Never   • Sexual activity: Not Currently     Partners: Male     Birth control/protection: None   Other Topics Concern   • Not on file   Social History Narrative   • Not on file     Social Determinants of Health     Financial Resource Strain: Low Risk    • Difficulty of Paying Living Expenses: Not hard at all   Food Insecurity: Not on file   Transportation Needs: No Transportation Needs   • Lack of Transportation (Medical): No   • Lack of Transportation (Non-Medical): No   Physical Activity: Not on file   Stress: Not on file   Social Connections: Not on file   Intimate Partner Violence: Not on file   Housing Stability: Not on file      Medications and Allergies:     Current Outpatient Medications   Medication Sig Dispense Refill   • calcium carbonate (OS-NEISHA) 1250 (500 Ca) MG chewable tablet Chew 1 tablet (1,250 mg total) daily 90 tablet 1   • calcium carbonate-vitamin D 500 mg-5 mcg per tablet Take 1 tablet by mouth 2 (two) times a day with meals     • glucosamine-chondroitin 500-400 MG tablet Take 1 tablet by mouth Three times a day     • hydrochlorothiazide (HYDRODIURIL) 25 mg tablet Take 1 tablet (25 mg total) by mouth daily 90 tablet 3   • losartan (COZAAR) 100 MG tablet Take 1 tablet (100 mg total) by mouth daily 90 tablet 1   • metroNIDAZOLE (METROCREAM) 0 75 % cream Apply topically 2 (two) times a day 45 g 1   • pravastatin (PRAVACHOL) 20 mg tablet Take 1 tablet (20 mg total) by mouth daily 90 tablet 1     No current facility-administered medications for this visit       Allergies   Allergen Reactions   • Silicon Other (See Comments)     The silicon removed her skin   • Omeprazole Other (See Comments)   • Other Sneezing     Hayfever, red eyes      Immunizations:     Immunization History   Administered Date(s) Administered   • COVID-19 PFIZER VACCINE 0 3 ML IM 03/05/2021, 03/24/2021   • INFLUENZA 10/21/2015, 10/15/2018, 10/14/2019, 10/01/2020   • Influenza Split High Dose Preservative Free IM 10/07/2017, 10/21/2018   • Influenza, high dose seasonal 0 7 mL 09/16/2021   • Influenza, seasonal, injectable, preservative free 10/03/2016   • Pneumococcal Conjugate 13-Valent 09/13/2018   • Pneumococcal Polysaccharide PPV23 06/12/2014   • Tdap 02/04/2010   • Zoster 01/14/2010   • Zoster Vaccine Recombinant 01/14/2010      Health Maintenance:         Topic Date Due   • Breast Cancer Screening: Mammogram  11/11/2023   • Colorectal Cancer Screening  01/12/2032   • Hepatitis C Screening  Completed         Topic Date Due   • COVID-19 Vaccine (3 - Booster for Pfizer series) 05/19/2021   • Influenza Vaccine (1) 09/01/2022      Medicare Screening Tests and Risk Assessments:     Chace Hendricks is here for her Subsequent Wellness visit  Last Medicare Wellness visit information reviewed, patient interviewed and updates made to the record today  Health Risk Assessment:   Patient rates overall health as excellent  Patient feels that their physical health rating is same  Patient is very satisfied with their life  Eyesight was rated as same  Hearing was rated as same  Patient feels that their emotional and mental health rating is much better  Patients states they are never, rarely angry  Patient states they are never, rarely unusually tired/fatigued  Pain experienced in the last 7 days has been none  Patient states that she has experienced no weight loss or gain in last 6 months  Depression Screening:   PHQ-2 Score: 0      Fall Risk Screening: In the past year, patient has experienced: no history of falling in past year      Urinary Incontinence Screening:   Patient has not leaked urine accidently in the last six months  Home Safety:  Patient does not have trouble with stairs inside or outside of their home  Patient has working smoke alarms and has no working carbon monoxide detector  Home safety hazards include: none  Nutrition:   Current diet is Regular, No Added Salt and Limited junk food  Medications:   Patient is currently taking over-the-counter supplements  OTC medications include: Os Laith  Patient is able to manage medications  Activities of Daily Living (ADLs)/Instrumental Activities of Daily Living (IADLs):   Walk and transfer into and out of bed and chair?: Yes  Dress and groom yourself?: Yes    Bathe or shower yourself?: Yes    Feed yourself? Yes  Do your laundry/housekeeping?: Yes  Manage your money, pay your bills and track your expenses?: Yes  Make your own meals?: Yes    Do your own shopping?: Yes    Previous Hospitalizations:   Any hospitalizations or ED visits within the last 12 months?: No      Advance Care Planning:   Living will: Yes    Durable POA for healthcare: Yes    Advanced directive: Yes    Advanced directive counseling given: Yes      PREVENTIVE SCREENINGS      Cardiovascular Screening:    General: Screening Not Indicated and History Lipid Disorder      Diabetes Screening:     General: Screening Current      Colorectal Cancer Screening:     General: Screening Current      Breast Cancer Screening:     General: Screening Current      Cervical Cancer Screening:    General: Screening Not Indicated      Osteoporosis Screening:    General: Screening Not Indicated and History Osteoporosis      Abdominal Aortic Aneurysm (AAA) Screening:        General: Screening Current      Lung Cancer Screening:     General: Screening Not Indicated      Hepatitis C Screening:    General: Screening Current    Screening, Brief Intervention, and Referral to Treatment (SBIRT)    Screening  Typical number of drinks in a day: 0  Typical number of drinks in a week: 0  Interpretation: Low risk drinking behavior      AUDIT-C Screenin) How often did you have a drink containing alcohol in the past year? monthly or less  2) How many drinks did you have on a typical day when you were drinking in the past year? 0  3) How often did you have 6 or more drinks on one occasion in the past year? never    AUDIT-C Score: 1  Interpretation: Score 0-2 (female): Negative screen for alcohol misuse    Single Item Drug Screening:  How often have you used an illegal drug (including marijuana) or a prescription medication for non-medical reasons in the past year? never    Single Item Drug Screen Score: 0  Interpretation: Negative screen for possible drug use disorder    No results found  Physical Exam:     There were no vitals taken for this visit  Physical Exam  Vitals and nursing note reviewed  Constitutional:       General: She is not in acute distress  Appearance: She is well-developed  HENT:      Head: Normocephalic and atraumatic  Right Ear: Tympanic membrane normal       Left Ear: Tympanic membrane normal    Eyes:      Conjunctiva/sclera: Conjunctivae normal    Cardiovascular:      Rate and Rhythm: Normal rate and regular rhythm  Heart sounds: No murmur heard  Pulmonary:      Effort: Pulmonary effort is normal  No respiratory distress  Breath sounds: Normal breath sounds  Abdominal:      Palpations: Abdomen is soft  Tenderness: There is no abdominal tenderness  Musculoskeletal:         General: No swelling  Normal range of motion  Cervical back: Neck supple  Skin:     General: Skin is warm and dry  Capillary Refill: Capillary refill takes less than 2 seconds  Neurological:      Mental Status: She is alert     Psychiatric:         Mood and Affect: Mood normal           Murjesse Chandra MD

## 2023-04-05 LAB
BILIRUB UR QL STRIP: NEGATIVE
CLARITY UR: CLEAR
COLOR UR: NORMAL
GLUCOSE UR STRIP-MCNC: NEGATIVE MG/DL
HGB UR QL STRIP.AUTO: NEGATIVE
KETONES UR STRIP-MCNC: NEGATIVE MG/DL
LEUKOCYTE ESTERASE UR QL STRIP: NEGATIVE
NITRITE UR QL STRIP: NEGATIVE
PH UR STRIP.AUTO: 7 [PH]
PROT UR STRIP-MCNC: NEGATIVE MG/DL
SP GR UR STRIP.AUTO: 1.02 (ref 1–1.03)
UROBILINOGEN UR STRIP-ACNC: <2 MG/DL

## 2023-11-10 ENCOUNTER — TELEPHONE (OUTPATIENT)
Dept: INTERNAL MEDICINE CLINIC | Age: 73
End: 2023-11-10